# Patient Record
Sex: FEMALE | Race: WHITE | NOT HISPANIC OR LATINO | Employment: OTHER | URBAN - METROPOLITAN AREA
[De-identification: names, ages, dates, MRNs, and addresses within clinical notes are randomized per-mention and may not be internally consistent; named-entity substitution may affect disease eponyms.]

---

## 2017-05-16 ENCOUNTER — TELEPHONE (OUTPATIENT)
Dept: FAMILY MEDICINE | Facility: CLINIC | Age: 27
End: 2017-05-16
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
## 2017-05-16 NOTE — TELEPHONE ENCOUNTER
Patient calling to set up an appointment for ADHD. She was seen about 3 years ago for this issue and would like to restart medication.

## 2017-11-18 ENCOUNTER — HEALTH MAINTENANCE LETTER (OUTPATIENT)
Age: 27
End: 2017-11-18

## 2018-01-15 ENCOUNTER — TELEPHONE (OUTPATIENT)
Dept: FAMILY MEDICINE | Facility: CLINIC | Age: 28
End: 2018-01-15

## 2018-01-15 NOTE — TELEPHONE ENCOUNTER
Phone number provided is not a working number.   Attempted to contact number in demographics, which is disconnected.  Candace Nunes RN

## 2018-01-15 NOTE — TELEPHONE ENCOUNTER
Patient is out of town would like to speak with Dr. Mixon about some abdominal bleeding would like her opinion   Please call to advice  Thank you

## 2018-01-15 NOTE — TELEPHONE ENCOUNTER
"\"your call cannot be completed at this time\".  patient has not seen Dr Criss Mixon in > 2+ years.   Per Dr Criss Mixon, patient will need to be seen in [place], will not be speaking with patient.  Candace Nunes RN     "

## 2018-01-17 NOTE — TELEPHONE ENCOUNTER
"\"Your call cannot be completed\".  Will close encounter, unable to contact patient after multiple attempts.  Candace Nunes RN     "

## 2018-02-03 ENCOUNTER — NURSE TRIAGE (OUTPATIENT)
Dept: NURSING | Facility: CLINIC | Age: 28
End: 2018-02-03

## 2018-02-04 ENCOUNTER — OFFICE VISIT (OUTPATIENT)
Dept: URGENT CARE | Facility: URGENT CARE | Age: 28
End: 2018-02-04
Payer: COMMERCIAL

## 2018-02-04 VITALS
OXYGEN SATURATION: 100 % | HEART RATE: 65 BPM | WEIGHT: 123.8 LBS | DIASTOLIC BLOOD PRESSURE: 70 MMHG | BODY MASS INDEX: 21.59 KG/M2 | SYSTOLIC BLOOD PRESSURE: 122 MMHG | TEMPERATURE: 98.5 F

## 2018-02-04 DIAGNOSIS — N93.9 ABNORMAL VAGINAL BLEEDING: Primary | ICD-10-CM

## 2018-02-04 LAB — BETA HCG QUAL IFA URINE: NEGATIVE

## 2018-02-04 PROCEDURE — 84703 CHORIONIC GONADOTROPIN ASSAY: CPT | Performed by: FAMILY MEDICINE

## 2018-02-04 PROCEDURE — 99213 OFFICE O/P EST LOW 20 MIN: CPT | Performed by: FAMILY MEDICINE

## 2018-02-04 NOTE — MR AVS SNAPSHOT
After Visit Summary   2/4/2018    Verna Tomlin    MRN: [id number]           Patient Information     Date Of Birth          1990        Visit Information        Provider Department      2/4/2018 12:15 PM Charlie Barriga MD [facility]        Today's Diagnoses     Abnormal vaginal bleeding    -  1       Follow-ups after your visit        Who to contact     If you have questions or need follow up information about today's clinic visit or your schedule please contact [facility] directly at [phone].  Normal or non-critical lab and imaging results will be communicated to you by [employer], letter or phone within 4 business days after the clinic has received the results. If you do not hear from us within 7 days, please contact the clinic through [employer] or phone. If you have a critical or abnormal lab result, we will notify you by phone as soon as possible.  Submit refill requests through [employer] or call your pharmacy and they will forward the refill request to us. Please allow 3 business days for your refill to be completed.          Additional Information About Your Visit        MyChart Information     [employer] gives you secure access to your electronic health record. If you see a primary care provider, you can also send messages to your care team and make appointments. If you have questions, please call your primary care clinic.  If you do not have a primary care provider, please call [phone] and they will assist you.        Care EveryWhere ID     This is your Care EveryWhere ID. This could be used by other organizations to access your [place] medical records  [id number]        Your Vitals Were     Pulse Temperature Pulse Oximetry BMI (Body Mass Index)          65 98.5  F (36.9  C) (Tympanic) 100% 21.59 kg/m2         Blood Pressure from Last 3 Encounters:   02/04/18 122/70   10/20/16 142/79   07/11/16 118/72    Weight from Last 3 Encounters:   02/04/18  123 lb 12.8 oz (56.2 kg)   10/20/16 126 lb 3.2 oz (57.2 kg)   07/11/16 126 lb (57.2 kg)              We Performed the Following     Beta HCG qual IFA urine - [facility] and [place]        Primary Care Provider Office Phone # Fax #    Opal Devang [facility] [phone]       [address] [facility] [address]        Equal Access to Services     ROMINA SUTHERLAND : Hadii aad ku hadasho Soomaali, waaxda luqadaha, qaybta kaalmada adeegyada, waxay idiin hayaan adeeg kharagermain la'david . So [facility] [phone].    ATENCIÓN: Si daniellala espjesenia, tiene a garcia disposición servicios gratuitos de asistencia lingüística. [facility] [phone].    We comply with applicable federal civil rights laws and [place] laws. We do not discriminate on the basis of race, color, national origin, age, disability, sex, sexual orientation, or gender identity.            Thank you!     Thank you for choosing [facility]  for your care. Our goal is always to provide you with excellent care. Hearing back from our patients is one way we can continue to improve our services. Please take a few minutes to complete the written survey that you may receive in the mail after your visit with us. Thank you!             Your Updated Medication List - Protect others around you: Learn how to safely use, store and throw away your medicines at www.disposemymeds.org.          This list is accurate as of 2/4/18  1:36 PM.  Always use your most recent med list.                   Brand Name Dispense Instructions for use Diagnosis    ondansetron 4 MG ODT tab    ZOFRAN ODT    20 tablet    Take 1-2 tablets (4-8 mg) by mouth every 8 hours as needed for nausea    Mastitis, left, acute, Intractable vomiting with nausea, unspecified vomiting type

## 2018-02-04 NOTE — NURSING NOTE
"Chief Complaint   Patient presents with     Abnormal Bleeding Problem       Initial /70  Pulse 65  Temp 98.5  F (36.9  C) (Tympanic)  Wt 123 lb 12.8 oz (56.2 kg)  SpO2 100%  BMI 21.59 kg/m2 Estimated body mass index is 21.59 kg/(m^2) as calculated from the following:    Height as of 1/25/16: 5' 3.5\" (1.613 m).    Weight as of this encounter: 123 lb 12.8 oz (56.2 kg).  Medication Reconciliation: complete     Molly Orozco MA      "

## 2018-02-04 NOTE — TELEPHONE ENCOUNTER
"  Reason for Disposition    [1] MILD-MODERATE pain AND [2] constant AND [3] present > 2 hours    Additional Information    Negative: Shock suspected (e.g., cold/pale/clammy skin, too weak to stand, low BP, rapid pulse)    Negative: Difficult to awaken or acting confused  (e.g., disoriented, slurred speech)    Negative: Passed out (i.e., lost consciousness, collapsed and was not responding)    Negative: Sounds like a life-threatening emergency to the triager    Negative: Chest pain    Negative: Pain is mainly in upper abdomen  (if needed ask: \"is it mainly above the belly button?\")    Negative: Followed an abdomen (stomach) injury    Negative: [1] Abdominal pain AND [2] pregnant < 20 weeks    Negative: [1] Abdominal pain AND [2] pregnant > 20 weeks    Negative: [1] Abdominal pain AND [2] postpartum < 1 month since delivery    Negative: [1] SEVERE pain (e.g., excruciating) AND [2] present > 1 hour    Negative: [1] SEVERE pain AND [2] age > 60    Negative: [1] Vomiting AND [2] contains red blood or black (\"coffee ground\") material  (Exception: few red streaks in vomit that only happened once)    Negative: Blood in bowel movements   (Exception: blood on surface of BM with constipation)    Negative: Black or tarry bowel movements  (Exception: chronic-unchanged  black-grey bowel movements AND is taking iron pills or Pepto-bismol)    Negative: Patient sounds very sick or weak to the triager    Protocols used: ABDOMINAL PAIN - FEMALE-ADULT-DARIO    Verna calls and says that she has some lower, middle, abdominal cramping. Cramping began today. Cramping pain = 3/10. Pt. Says that she is 2 weeks late, for her period. Pt. Says that her home pregnancy test, from last night, was negative. Pt. Also says that she has slight, brown, vaginal discharge. Pt. Says that she may go to the [facility] tonight.  "

## 2018-02-04 NOTE — PROGRESS NOTES
Chief Complaint   Patient presents with     Abnormal Bleeding Problem      ADDITIONAL HPI: 27 year old female here for above issue. LMP 12/27-31. Then had bleeding 1/15 very light for a few days.  Last few days has had bleeding after intrcourse. Denies any abdomen/pelvic pain. No vaginal discharge.    ROS: 10 point review of systems negative except as per HPI.    PAST MEDICAL HISTORY:  Past Medical History:   Diagnosis Date     ADD (attention deficit disorder with hyperactivity)      Alcohol abuse 5/29/2012     Anxiety      ASCUS favor benign 9/2014    Neg high risk HPV - pap in 3 yrs per [facility]     ASCUS on Pap smear 8/3/10    High risk HPV 66     Asthma     excercise induced     Borderline personality disorder 5/29/2012     Depression      Depressive disorder 10/10/2014     Polysubstance abuse 5/29/2012     Varicella without mention of complication 1997    Chickenpox        ACTIVE MEDICAL PROBLEMS:  Patient Active Problem List   Diagnosis     ADHD (attention deficit hyperactivity disorder)     Varicella     Health Care Home     CARDIOVASCULAR SCREENING; LDL GOAL LESS THAN 160     Mild major depression (H)     Borderline personality disorder     Anxiety     Supervision of normal first pregnancy     Trochanteric bursitis of both hips     Left knee pain        FAMILY HISTORY:  Family History   Problem Relation Age of Onset     DIABETES Mother      CANCER Mother      thryroid cancer     Thyroid Disease Mother      DIABETES Maternal Grandmother      CANCER Maternal Grandmother      thyroid cancer     Thyroid Disease Maternal Grandmother      DIABETES Maternal Grandfather      Prostate Cancer Maternal Grandfather      Hypertension Father      Hypertension Paternal Grandfather      Thyroid Disease Other        SOCIAL HISTORY:  Social History     Social History     Marital status:      Spouse name: N/A     Number of children: N/A     Years of education: N/A     Occupational History     Not on file.     Social  History Main Topics     Smoking status: Never Smoker     Smokeless tobacco: Never Used      Comment: nonsmoking household     Alcohol use No     Drug use: No      Comment: Heroin use     Sexual activity: Yes     Partners: Male     Other Topics Concern     Parent/Sibling W/ Cabg, Mi Or Angioplasty Before 65f 55m? No     Social History Narrative       MEDICATIONS:  Current Outpatient Prescriptions   Medication Sig Dispense Refill     ondansetron (ZOFRAN ODT) 4 MG disintegrating tablet Take 1-2 tablets (4-8 mg) by mouth every 8 hours as needed for nausea (Patient not taking: Reported on 2/4/2018) 20 tablet 1       ALLERGIES:     Allergies   Allergen Reactions     Bactrim Rash       Problem list, Medication list, Allergies, and Medical/Social/Surgical histories reviewed in [facility] and updated as appropriate.    OBJECTIVE:                                                    VITALS: /70  Pulse 65  Temp 98.5  F (36.9  C) (Tympanic)  Wt 123 lb 12.8 oz (56.2 kg)  SpO2 100%  BMI 21.59 kg/m2 Body mass index is 21.59 kg/(m^2).  GENERAL: Pleasant, well appearing female.  ABDOMEN: Soft, nondistended, nontender, no hepatosplenomegaly. No palpable masses.     Results for orders placed or performed in visit on 02/04/18   Beta HCG qual IFA urine - [facility] [place]   Result Value Ref Range    Beta HCG Qual IFA Urine Negative NEG^Negative           ASSESSMENT/PLAN:                                                    1. Abnormal vaginal bleeding  Possible early SAB or failed implantation leading to irregular bleeding. Recommended pelvic exam today but patient declined. She mainly wanted to know if she's pregnant or not. Advised follow-up PRN if ongoing irregular bleeding or other pelvic symptoms.  - Beta HCG qual IFA urine - [facility] [place]

## 2018-02-13 ENCOUNTER — RECORDS - HEALTHEAST (OUTPATIENT)
Dept: ADMINISTRATIVE | Facility: OTHER | Age: 28
End: 2018-02-13

## 2018-05-11 ENCOUNTER — TELEPHONE (OUTPATIENT)
Dept: FAMILY MEDICINE | Facility: CLINIC | Age: 28
End: 2018-05-11

## 2018-05-11 NOTE — TELEPHONE ENCOUNTER
Patient also running the same thing by me.  I listened and she is anxious.  She had a very scant ? Menses.  ? If she didn't ovulate, ? If she is early in pregnancy? If she is just late for menses.     I did advise her to take prenatal vits, no alcohol.  Repeat Pregnancy test this weekend if she doesn't have menses. See LEONA Junior RN- Triage [employer]

## 2018-05-11 NOTE — TELEPHONE ENCOUNTER
"Patient called requesting an appt. Patient stated she is concerned since her period was very short. Patient does not remember LMP other than \"a couple days ago.\" It was very difficult to hear patient on the phone and several times attempted to have patient clarify what she is saying. Patient reported an abnl pap in the past so not sure if that is the cause. Patient stated she has done UPT and it is negative. Patient requested an appt with a female physician. Offered her an appt on 05-24-18 at 1330 with Dr. Rodríguez. Patient very is appreciative of assistance. Gabriela Garcia RN, BAN    "

## 2018-05-11 NOTE — TELEPHONE ENCOUNTER
"Pt has not been seen here for several years. She is calling because she had a 1 hour period and normally she has a 4 day period. Neg pregnancy test. She would like to know what to do [phone]. [facility] and states they are not very \"female friendly\" Thank you.  Latanya Pearl,     "

## 2018-05-11 NOTE — TELEPHONE ENCOUNTER
Left message for Verna to call back.  Looks like she had abnormal bleeding in Feb also.  Has she been seen? Should be seen her on OBGYN  Keli Junior RN- Triage [employer]

## 2018-05-17 ENCOUNTER — TRANSFERRED RECORDS (OUTPATIENT)
Dept: HEALTH INFORMATION MANAGEMENT | Facility: CLINIC | Age: 28
End: 2018-05-17

## 2018-05-19 ENCOUNTER — TRANSFERRED RECORDS (OUTPATIENT)
Dept: HEALTH INFORMATION MANAGEMENT | Facility: CLINIC | Age: 28
End: 2018-05-19

## 2018-05-23 ENCOUNTER — TRANSFERRED RECORDS (OUTPATIENT)
Dept: HEALTH INFORMATION MANAGEMENT | Facility: CLINIC | Age: 28
End: 2018-05-23

## 2018-05-28 ENCOUNTER — MEDICAL CORRESPONDENCE (OUTPATIENT)
Dept: HEALTH INFORMATION MANAGEMENT | Facility: CLINIC | Age: 28
End: 2018-05-28

## 2018-05-28 ENCOUNTER — TRANSFERRED RECORDS (OUTPATIENT)
Dept: HEALTH INFORMATION MANAGEMENT | Facility: CLINIC | Age: 28
End: 2018-05-28

## 2018-06-06 ENCOUNTER — TRANSFERRED RECORDS (OUTPATIENT)
Dept: HEALTH INFORMATION MANAGEMENT | Facility: CLINIC | Age: 28
End: 2018-06-06

## 2018-06-09 ENCOUNTER — TRANSFERRED RECORDS (OUTPATIENT)
Dept: HEALTH INFORMATION MANAGEMENT | Facility: CLINIC | Age: 28
End: 2018-06-09

## 2018-06-09 LAB
ALT SERPL-CCNC: 22 U/L (ref 13–61)
AST SERPL-CCNC: 18 U/L (ref 15–37)
CREAT SERPL-MCNC: 0.9 MG/DL (ref 0.5–1)
GFR SERPL CREATININE-BSD FRML MDRD: >60 ML/MIN/1.73M2
GLUCOSE SERPL-MCNC: 111 MG/DL (ref 70–100)
POTASSIUM SERPL-SCNC: 4 MMOL/L (ref 3.5–5)

## 2018-06-21 ENCOUNTER — TRANSFERRED RECORDS (OUTPATIENT)
Dept: HEALTH INFORMATION MANAGEMENT | Facility: CLINIC | Age: 28
End: 2018-06-21

## 2018-07-04 ENCOUNTER — HOSPITAL ENCOUNTER (EMERGENCY)
Facility: CLINIC | Age: 28
Discharge: PSYCHIATRIC OR SUBSTANCE USE FACILITY | End: 2018-07-05
Attending: EMERGENCY MEDICINE | Admitting: EMERGENCY MEDICINE

## 2018-07-04 DIAGNOSIS — F30.9 MANIA (H): ICD-10-CM

## 2018-07-04 LAB
AMPHETAMINES UR QL SCN: POSITIVE
ANION GAP SERPL CALCULATED.3IONS-SCNC: 8 MMOL/L (ref 3–14)
APAP SERPL-MCNC: <2 MG/L (ref 10–20)
BARBITURATES UR QL: NEGATIVE
BASOPHILS # BLD AUTO: 0 10E9/L (ref 0–0.2)
BASOPHILS NFR BLD AUTO: 0.4 %
BENZODIAZ UR QL: NEGATIVE
BUN SERPL-MCNC: 10 MG/DL (ref 7–30)
CALCIUM SERPL-MCNC: 8.8 MG/DL (ref 8.5–10.1)
CANNABINOIDS UR QL SCN: NEGATIVE
CHLORIDE SERPL-SCNC: 112 MMOL/L (ref 94–109)
CO2 SERPL-SCNC: 24 MMOL/L (ref 20–32)
COCAINE UR QL: NEGATIVE
CREAT SERPL-MCNC: 0.94 MG/DL (ref 0.52–1.04)
DIFFERENTIAL METHOD BLD: NORMAL
EOSINOPHIL # BLD AUTO: 0.1 10E9/L (ref 0–0.7)
EOSINOPHIL NFR BLD AUTO: 1.6 %
ERYTHROCYTE [DISTWIDTH] IN BLOOD BY AUTOMATED COUNT: 13.3 % (ref 10–15)
ETHANOL SERPL-MCNC: 0.15 G/DL
GFR SERPL CREATININE-BSD FRML MDRD: 72 ML/MIN/1.7M2
GLUCOSE SERPL-MCNC: 64 MG/DL (ref 70–99)
HCG UR QL: NEGATIVE
HCT VFR BLD AUTO: 42.3 % (ref 35–47)
HGB BLD-MCNC: 14.7 G/DL (ref 11.7–15.7)
IMM GRANULOCYTES # BLD: 0 10E9/L (ref 0–0.4)
IMM GRANULOCYTES NFR BLD: 0.1 %
LYMPHOCYTES # BLD AUTO: 3 10E9/L (ref 0.8–5.3)
LYMPHOCYTES NFR BLD AUTO: 35.3 %
MCH RBC QN AUTO: 29.9 PG (ref 26.5–33)
MCHC RBC AUTO-ENTMCNC: 34.8 G/DL (ref 31.5–36.5)
MCV RBC AUTO: 86 FL (ref 78–100)
MONOCYTES # BLD AUTO: 0.6 10E9/L (ref 0–1.3)
MONOCYTES NFR BLD AUTO: 7.5 %
NEUTROPHILS # BLD AUTO: 4.7 10E9/L (ref 1.6–8.3)
NEUTROPHILS NFR BLD AUTO: 55.1 %
NRBC # BLD AUTO: 0 10*3/UL
NRBC BLD AUTO-RTO: 0 /100
OPIATES UR QL SCN: NEGATIVE
PCP UR QL SCN: NEGATIVE
PLATELET # BLD AUTO: 274 10E9/L (ref 150–450)
POTASSIUM SERPL-SCNC: 3.8 MMOL/L (ref 3.4–5.3)
RBC # BLD AUTO: 4.91 10E12/L (ref 3.8–5.2)
SALICYLATES SERPL-MCNC: <2 MG/DL
SODIUM SERPL-SCNC: 144 MMOL/L (ref 133–144)
WBC # BLD AUTO: 8.6 10E9/L (ref 4–11)

## 2018-07-04 PROCEDURE — 80320 DRUG SCREEN QUANTALCOHOLS: CPT | Performed by: EMERGENCY MEDICINE

## 2018-07-04 PROCEDURE — 90791 PSYCH DIAGNOSTIC EVALUATION: CPT

## 2018-07-04 PROCEDURE — 81025 URINE PREGNANCY TEST: CPT | Performed by: EMERGENCY MEDICINE

## 2018-07-04 PROCEDURE — 80329 ANALGESICS NON-OPIOID 1 OR 2: CPT | Performed by: EMERGENCY MEDICINE

## 2018-07-04 PROCEDURE — 80307 DRUG TEST PRSMV CHEM ANLYZR: CPT | Performed by: EMERGENCY MEDICINE

## 2018-07-04 PROCEDURE — 25000132 ZZH RX MED GY IP 250 OP 250 PS 637: Performed by: EMERGENCY MEDICINE

## 2018-07-04 PROCEDURE — 80048 BASIC METABOLIC PNL TOTAL CA: CPT | Performed by: EMERGENCY MEDICINE

## 2018-07-04 PROCEDURE — 85025 COMPLETE CBC W/AUTO DIFF WBC: CPT | Performed by: EMERGENCY MEDICINE

## 2018-07-04 PROCEDURE — 99285 EMERGENCY DEPT VISIT HI MDM: CPT | Mod: 25

## 2018-07-04 RX ORDER — ZIPRASIDONE HYDROCHLORIDE 40 MG/1
40 CAPSULE ORAL 2 TIMES DAILY WITH MEALS
Status: DISCONTINUED | OUTPATIENT
Start: 2018-07-04 | End: 2018-07-05 | Stop reason: HOSPADM

## 2018-07-04 RX ORDER — OLANZAPINE 5 MG/1
5 TABLET, ORALLY DISINTEGRATING ORAL ONCE
Status: COMPLETED | OUTPATIENT
Start: 2018-07-04 | End: 2018-07-04

## 2018-07-04 RX ORDER — ALBUTEROL SULFATE 90 UG/1
2 AEROSOL, METERED RESPIRATORY (INHALATION) EVERY 4 HOURS PRN
COMMUNITY
End: 2020-08-19

## 2018-07-04 RX ADMIN — ZIPRASIDONE HCL 40 MG: 40 CAPSULE ORAL at 22:12

## 2018-07-04 RX ADMIN — OLANZAPINE 5 MG: 5 TABLET, ORALLY DISINTEGRATING ORAL at 18:20
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
## 2018-07-04 NOTE — ED NOTES
[facility] go the [facility]. MD aware. Pt calls 911. MD at bedside and phone taken away from pt. Garage door reclosed with phone behind it. Security notified of 911 call.

## 2018-07-04 NOTE — ED NOTES
Per [facility], pt unable to be found [facility] at the [facility].  of pt calls the [facility] and MD calls back. Phone # [phone]
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
## 2018-07-05 ENCOUNTER — HOSPITAL ENCOUNTER (INPATIENT)
Facility: CLINIC | Age: 28
LOS: 1 days | Discharge: HOME | DRG: 885 | End: 2018-07-06
Attending: PSYCHIATRY & NEUROLOGY | Admitting: PSYCHIATRY & NEUROLOGY

## 2018-07-05 VITALS
OXYGEN SATURATION: 97 % | SYSTOLIC BLOOD PRESSURE: 101 MMHG | DIASTOLIC BLOOD PRESSURE: 69 MMHG | RESPIRATION RATE: 16 BRPM | TEMPERATURE: 98.7 F

## 2018-07-05 PROBLEM — F31.9 BIPOLAR DISORDER (H): Status: ACTIVE | Noted: 2018-07-05

## 2018-07-05 LAB — ALCOHOL BREATH TEST: 0 (ref 0–0.01)

## 2018-07-05 PROCEDURE — 25000132 ZZH RX MED GY IP 250 OP 250 PS 637: Performed by: PSYCHIATRY & NEUROLOGY

## 2018-07-05 PROCEDURE — 12400007 ZZH R&B MH INTERMEDIATE UMMC

## 2018-07-05 PROCEDURE — 25000132 ZZH RX MED GY IP 250 OP 250 PS 637: Performed by: EMERGENCY MEDICINE

## 2018-07-05 RX ORDER — HYDROXYZINE HYDROCHLORIDE 25 MG/1
25 TABLET, FILM COATED ORAL EVERY 4 HOURS PRN
Status: DISCONTINUED | OUTPATIENT
Start: 2018-07-05 | End: 2018-07-06 | Stop reason: HOSPADM

## 2018-07-05 RX ORDER — WATER 10 ML/10ML
INJECTION INTRAMUSCULAR; INTRAVENOUS; SUBCUTANEOUS
Status: DISCONTINUED
Start: 2018-07-05 | End: 2018-07-05 | Stop reason: HOSPADM

## 2018-07-05 RX ORDER — ACETAMINOPHEN 325 MG/1
650 TABLET ORAL EVERY 4 HOURS PRN
Status: DISCONTINUED | OUTPATIENT
Start: 2018-07-05 | End: 2018-07-06 | Stop reason: HOSPADM

## 2018-07-05 RX ORDER — TRAZODONE HYDROCHLORIDE 50 MG/1
50 TABLET, FILM COATED ORAL
Status: DISCONTINUED | OUTPATIENT
Start: 2018-07-05 | End: 2018-07-06 | Stop reason: HOSPADM

## 2018-07-05 RX ORDER — ALUMINA, MAGNESIA, AND SIMETHICONE 2400; 2400; 240 MG/30ML; MG/30ML; MG/30ML
30 SUSPENSION ORAL EVERY 4 HOURS PRN
Status: DISCONTINUED | OUTPATIENT
Start: 2018-07-05 | End: 2018-07-06 | Stop reason: HOSPADM

## 2018-07-05 RX ORDER — OLANZAPINE 10 MG/2ML
10 INJECTION, POWDER, FOR SOLUTION INTRAMUSCULAR
Status: DISCONTINUED | OUTPATIENT
Start: 2018-07-05 | End: 2018-07-06 | Stop reason: HOSPADM

## 2018-07-05 RX ORDER — ALBUTEROL SULFATE 90 UG/1
2 AEROSOL, METERED RESPIRATORY (INHALATION) EVERY 4 HOURS PRN
Status: DISCONTINUED | OUTPATIENT
Start: 2018-07-05 | End: 2018-07-06 | Stop reason: HOSPADM

## 2018-07-05 RX ORDER — PRAZOSIN HYDROCHLORIDE 1 MG/1
1 CAPSULE ORAL AT BEDTIME
Status: COMPLETED | OUTPATIENT
Start: 2018-07-05 | End: 2018-07-05

## 2018-07-05 RX ORDER — ZIPRASIDONE HYDROCHLORIDE 20 MG/1
40 CAPSULE ORAL 2 TIMES DAILY WITH MEALS
Status: DISCONTINUED | OUTPATIENT
Start: 2018-07-05 | End: 2018-07-06 | Stop reason: HOSPADM

## 2018-07-05 RX ORDER — OLANZAPINE 10 MG/1
10 TABLET ORAL
Status: DISCONTINUED | OUTPATIENT
Start: 2018-07-05 | End: 2018-07-06 | Stop reason: HOSPADM

## 2018-07-05 RX ADMIN — TRAZODONE HYDROCHLORIDE 50 MG: 50 TABLET ORAL at 20:53

## 2018-07-05 RX ADMIN — PRAZOSIN HYDROCHLORIDE 1 MG: 1 CAPSULE ORAL at 20:53

## 2018-07-05 RX ADMIN — ZIPRASIDONE HCL 40 MG: 20 CAPSULE ORAL at 19:56

## 2018-07-05 RX ADMIN — ZIPRASIDONE HCL 40 MG: 40 CAPSULE ORAL at 09:52

## 2018-07-05 ASSESSMENT — ACTIVITIES OF DAILY LIVING (ADL)
ORAL_HYGIENE: INDEPENDENT
DRESS: INDEPENDENT
GROOMING: SHOWER

## 2018-07-05 NOTE — IP AVS SNAPSHOT
Young Adult [facility]    [facility] [address]    [address] [address]    Phone:  [phone]                                       After Visit Summary   7/5/2018    Verna Moralez    MRN: [id number]           After Visit Summary Signature Page     I have received my discharge instructions, and my questions have been answered. I have discussed any challenges I see with this plan with the nurse or doctor.    ..........................................................................................................................................  Patient/Patient Representative Signature      ..........................................................................................................................................  Patient Representative Print Name and Relationship to Patient    ..................................................               ................................................  Date                                            Time    ..........................................................................................................................................  Reviewed by Signature/Title    ...................................................              ..............................................  Date                                                            Time
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
## 2018-07-05 NOTE — PROGRESS NOTES
Patient has an opened package of Natural Urdu Kratom capsules and a bottle of hydroxyzine.  These items are placed in an envelope for security.

## 2018-07-05 NOTE — IP AVS SNAPSHOT
MRN:[id number]                      After Visit Summary   7/5/2018    Verna Moralez    MRN: [id number]           Patient Information     Date Of Birth          1990        Designated Caregiver       Most Recent Value    Caregiver    Will someone help with your care after discharge? no      About your hospital stay     You were admitted on:  July 5, 2018 You last received care in the:  [facility]    You were discharged on:  July 6, 2018       Who to Call     For medical emergencies, please call 911.  For non-urgent questions about your medical care, please call your primary care provider or clinic, [phone]          Attending Provider     Provider Specialty    Brian Kelley MD Psychiatry       Primary Care Provider Office Phone # Fax #    [facility] [phone]      Further instructions from your care team        Behavioral Discharge Planning and Instructions      Summary:  You were admitted on 7/5/2018  due to Depression, PTSD (Post Tramatic Stress Disorder), Suicidal Ideations and Chemical Use Issues.  You were treated by Dr. Brian Kelley MD and discharged on 07/06/2018 from [facility] to Home      Principal Diagnosis:       Health Care Follow-up Appointments:   Psychiatry:  Date/Time: Tuesday July, 10, 3:30 PM    Provider: Dr. Rajni MD  [facility]  Address: [address] [place] [address]   Phone [phone]:  Fax: [phone]    Therapy:  Date/Time: Monday, July, 9, 1:00 PM    Provider: Ethel Adan  [facility]  Address: [address] [place] [address]   Phone [phone]:  Fax: [phone]  .  Attend all scheduled appointments with your outpatient providers. Call at least 24 hours in advance if you need to reschedule an appointment to ensure continued access to your outpatient providers.   Major Treatments, Procedures and Findings:  You were provided with: a  "psychiatric assessment, assessed for medical stability, medication evaluation and/or management, group therapy and milieu management    Symptoms to Report: feeling more aggressive, increased confusion, losing more sleep, mood getting worse or thoughts of suicide    Early warning signs can include: increased depression or anxiety sleep disturbances increased thoughts or behaviors of suicide or self-harm  increased unusual thinking, such as paranoia or hearing voices    Safety and Wellness:  Take all medicines as directed.  Make no changes unless your doctor suggests them.      Follow treatment recommendations.  Refrain from alcohol and non-prescribed drugs.  If there is a concern for safety, call 911.    Resources:   Crisis Intervention: 254.664.6499 or 377-206-2171 (TTY: 849.100.5436).  Call anytime for help.  [employer] [place] on Mental Illness (www.mn.deidra.org): 140.233.8488 or 477-099-7182.  Alcoholics Anonymous (www.alcoholics-anonymous.org): Check your phone book for your local chapter.  Suicide Awareness Voices of Education (SAVE) (www.save.org): 497-402-SAVE (8887)  [facility]- Phone: [phone]      The treatment team has appreciated the opportunity to work with you.     If you have any questions or concerns our unit number is [phone]  You may be receiving a follow-up phone call within the next three days from a representative from behavioral health.    You have identified the best phone number to reach you as [phone]          Pending Results     No orders found for last 3 day(s).            Admission Information     Date & Time Provider Department Dept. Phone    7/5/2018 Brian Kelley MD [facility] [phone]      Your Vitals Were     Blood Pressure Pulse Temperature Respirations Height Weight    119/57 66 96  F (35.6  C) (Tympanic) 16 1.6 m (5' 3\") 53.5 kg (117 lb 15.1 oz)    Pulse Oximetry BMI (Body Mass Index)                98% 20.89 " "kg/m2          MyChart Information     ShowUhow lets you send messages to your doctor, view your test results, renew your prescriptions, schedule appointments and more. To sign up, go to [email or web address] . Click on \"Log in\" on the left side of the screen, which will take you to the Welcome page. Then click on \"Sign up Now\" on the right side of the page.     You will be asked to enter the access code listed below, as well as some personal information. Please follow the directions to create your username and password.     Your access code is: [email or web address]  Expires: 10/4/2018 12:41 PM     Your access code will  in 90 days. If you need help or a new code, please call your [place] clinic or [phone].        Care EveryWhere ID     This is your Care EveryWhere ID. This could be used by other organizations to access your [facility] medical records  [id number]        Equal Access to Services     ROMINA SUTHERLAND : Hadii cleveland huertao Soaakash, waaxda luqadaha, qaybta kaalmada adeegyada, selene villarreal . So [facility] [phone].    ATENCIÓN: Si habla español, tiene a garcia disposición servicios gratuitos de asistencia lingüística. Llame al [phone].    We comply with applicable federal civil rights laws and [place] laws. We do not discriminate on the basis of race, color, national origin, age, disability, sex, sexual orientation, or gender identity.               Review of your medicines      CONTINUE these medicines which have NOT CHANGED        Dose / Directions    albuterol 108 (90 Base) MCG/ACT Inhaler   Commonly known as:  PROAIR HFA/PROVENTIL HFA/VENTOLIN HFA        Dose:  2 puff   Inhale 2 puffs into the lungs every 4 hours as needed for shortness of breath / dyspnea or wheezing   Refills:  0       GABAPENTIN PO   Indication:  pain & numbness        Dose:  600 mg   Take 600 mg by mouth 3 times daily 2 x 300mg caps   Refills:  0       NITROFURANTOIN MONOHYD MACRO PO        Dose:  " 100 mg   Take 100 mg by mouth daily as needed (for use after intercourse to prevent UTI)   Refills:  0       ZIPRASIDONE HCL PO   Indication:  mood/anxiety        Dose:  40 mg   Take 40 mg by mouth 2 times daily (with meals)   Refills:  0                Protect others around you: Learn how to safely use, store and throw away your medicines at www.disposemymeds.org.             Medication List: This is a list of all your medications and when to take them. Check marks below indicate your daily home schedule. Keep this list as a reference.      Medications           Morning Afternoon Evening Bedtime As Needed    albuterol 108 (90 Base) MCG/ACT Inhaler   Commonly known as:  PROAIR HFA/PROVENTIL HFA/VENTOLIN HFA   Inhale 2 puffs into the lungs every 4 hours as needed for shortness of breath / dyspnea or wheezing                                GABAPENTIN PO   Take 600 mg by mouth 3 times daily 2 x 300mg caps                                NITROFURANTOIN MONOHYD MACRO PO   Take 100 mg by mouth daily as needed (for use after intercourse to prevent UTI)                                ZIPRASIDONE HCL PO   Take 40 mg by mouth 2 times daily (with meals)   Last time this was given:  40 mg on 7/6/2018  8:30 AM

## 2018-07-05 NOTE — ED NOTES
Signed out by Dr Mcginnis at change of shift.  In brief patient has been awaiting inpatient mental health bed availability.  Is a  with a h/o PTSD.  Provider and DEC assessment in agreement with admission for disorganized thoughts and manic symptoms.  Received Zyprexa on previous shift.    0830 - sleeping    1015 - Talked to DEC and nursing about patient.  Have her maiden name and SSN to [facility] to [facility] more [facility] about [facility] and history and determine if bed is available there.    Ultimately bed found.         Demetria Smith MD  07/05/18 3635

## 2018-07-05 NOTE — ED NOTES
Patient awake in bed asking for spouse's number to contact.  [facility]  Patient provided maiden name MENSCH.  Updated intake.  Patient working on ordering breakfast.  Patient was compliant with morning medication.

## 2018-07-05 NOTE — PROGRESS NOTES
07/05/18 1500   Patient Belongings   Did you bring any home meds/supplements to the hospital?  Yes   Disposition of meds  Sent to security/pharmacy per site process   Patient Belongings other (see comments)   Disposition of Belongings Other (see comment)   Belongings Search Yes   Clothing Search Yes   Second Staff (Yina Kramer)     Bin:  Black bag, DSM-5,  Spiral notebook, gray sweatshirt, green leggings, black bra, gray t-shirt, black flip flops, navy blue t-shirt, keys, charging plug, toiletries (razor inside), makeup    Given to RN:  Medication (1) and herbal supplement    Security ([id number]):  Visa Debit 3652    A               Admission:  I am responsible for any personal items that are not sent to the safe or pharmacy.  [facility] is not responsible for loss, theft or damage of any property in my possession.    Signature:  _________________________________ Date: _______  Time: _____                                              Staff Signature:  ____________________________ Date: ________  Time: _____      2nd Staff person, if patient is unable/unwilling to sign:    Signature: ________________________________ Date: ________  Time: _____     Discharge:  [facility] has returned all of my personal belongings:    Signature: _________________________________ Date: ________  Time: _____                                          Staff Signature:  ____________________________ Date: ________  Time: _____
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
## 2018-07-05 NOTE — ED NOTES
I assumed care of this patient at 5 PM.  She was signed out to me awaiting DEC  evaluation when she was more sober.  I have talked with Lore who feels that she should be admitted due to disorganized thoughts and manic behavior.  There are no beds and she will be kept her awaiting bed placement.  I have had pharmacy do a medication reconciliation and prescribed her Geodon.  Gabapentin is listed but in my review of her records it says this is a as needed for nightmares which he does not appear to have had here.  I did request and get [facility] records and have given these to DEC and these are quite extensive.  The records say she has been previously at [facility] but [facility] cannot find these records.  There has been some confusion with her having a previous name and perhaps the records have not been merged at all locations.  The patient has been checked on here;  I have talked to her and she was quite manic but cooperative.  After Zyprexa she has been largely sleeping and has not created any problems.  She will be awaiting bed placement in the morning.  She is currently on a health officer hold.  It may be worth rechecking with the [facility] in the morning to see if there are any female beds.     Lissy Blackman MD  07/05/18 0052

## 2018-07-05 NOTE — ED NOTES
Spoke with patient updating possible admission plans.  Patient confirms she is not suicidal.  Patient has been cooperative under writer's care and expresses wanting to be admitted to the [facility].

## 2018-07-05 NOTE — PHARMACY-ADMISSION MEDICATION HISTORY
"Admission medication history interview status for the 2018  admission is complete. See EPIC admission navigator for prior to admission medications     Medication history source reliability:Good    Actions taken by pharmacist (provider contacted, etc): Obtained signed consent from patient to obtain records from the [facility]. [facility] records were faxed over and list was entered into Epic.     Additional medication history information not noted on PTA med list : Records were obtained under a previous last name of \"MENSH\". Same Social Security Number and Same .     Medication reconciliation/reorder completed by provider prior to medication history? No    Time spent in this activity: 45 minutes    Prior to Admission medications    Medication Sig Last Dose Taking? Auth Provider   albuterol (PROAIR HFA/PROVENTIL HFA/VENTOLIN HFA) 108 (90 Base) MCG/ACT Inhaler Inhale 2 puffs into the lungs every 4 hours as needed for shortness of breath / dyspnea or wheezing  Yes Unknown, Entered By History   GABAPENTIN PO Take 600 mg by mouth 3 times daily 2 x 300mg caps  Yes Unknown, Entered By History   NITROFURANTOIN MONOHYD MACRO PO Take 100 mg by mouth daily as needed (for use after intercourse to prevent UTI)  Yes Unknown, Entered By History   ZIPRASIDONE HCL PO Take 40 mg by mouth 2 times daily (with meals)  Yes Unknown, Entered By History         "

## 2018-07-05 NOTE — PLAN OF CARE
Problem: Sleep Impairment (Psychotic Signs/Symptoms) (Adult)  Goal: Improved Sleep Hygiene (Psychotic Signs/Symptoms)  Outcome: Therapy, unable to show any progress toward functional goals  RNADMIT/    Pt arrives to  at 1500 from [facility] where she was residing on a 72 hour hold initiated 7/5/18 at 1425; while speaking on phone to her current  [place] is [place], she reported plan to drink herself to death;  requested welfare check and pt apprehended from home intoxicated.  pt admitted to   for further psychiatric care due to SI statements while intoxicated, manic symptoms and delusional statements.  Safety: suicide, S15                 Illness Management Recovery model: Objectives    Patient will identify reason(s) for hospitalization from their perspective.  Patient will identify a minimum of three goals for discharge.  Patient will identify a minimum of three triggers that may increase their symptoms.  Patient will identify a minimum of three coping skills they can do to stay well.  Patient will identify their support system to demonstrate readiness for discharge.    Illness Management & Recovery will assist patient to develop relapse prevention as    patient identifies triggers for relapse.  patient identifies a general wellness strategy.  patient identifies the warning signs that they are in danger of relapse.  patient identifies someone they count on to get feedback .  patient identifies ways to take action when in danger of relapse.  patient identifies way to cope with stress or other symptoms.   patient participates in self-reflection.    15 min checks initiated. Brief orientation to unit provided.    PROVIDER: Warren Kinney will continue to monitor.

## 2018-07-05 NOTE — ED NOTES
Dr[facility] Reid [facility] the [facility] and [facility] a [facility].  Pt. Does receive service there.  There are no [facility] currently at the [facility] for [facility].

## 2018-07-06 VITALS
WEIGHT: 117.95 LBS | HEIGHT: 63 IN | TEMPERATURE: 96 F | RESPIRATION RATE: 16 BRPM | HEART RATE: 66 BPM | OXYGEN SATURATION: 98 % | BODY MASS INDEX: 20.9 KG/M2 | SYSTOLIC BLOOD PRESSURE: 119 MMHG | DIASTOLIC BLOOD PRESSURE: 57 MMHG

## 2018-07-06 PROCEDURE — 99236 HOSP IP/OBS SAME DATE HI 85: CPT | Performed by: PSYCHIATRY & NEUROLOGY

## 2018-07-06 PROCEDURE — 25000132 ZZH RX MED GY IP 250 OP 250 PS 637: Performed by: PSYCHIATRY & NEUROLOGY

## 2018-07-06 RX ADMIN — ZIPRASIDONE HCL 40 MG: 20 CAPSULE ORAL at 08:30

## 2018-07-06 RX ADMIN — ACETAMINOPHEN 650 MG: 325 TABLET, FILM COATED ORAL at 11:22

## 2018-07-06 NOTE — PROGRESS NOTES
Problem: Overarching Goals (Adult)  Goal: Optimized Coping Skills in Response to Life Stressors    OT: pt was pleasant and focused for full duration of OT groups remaining engaged and completing complex tasks, contributing to discussion with peers and staff, and pt reporting being excited to discharge, little was learned about pt due to pt discharging by PM group.       07/06/18 1543   Occupational Therapy   Type of Intervention structured groups   Response Participates   Hours 2
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
## 2018-07-06 NOTE — DISCHARGE INSTRUCTIONS
Behavioral Discharge Planning and Instructions      Summary:  You were admitted on 7/5/2018  due to Depression, PTSD (Post Tramatic Stress Disorder), Suicidal Ideations and Chemical Use Issues.  You were treated by Dr. Brian Kelley MD and discharged on 07/06/2018 from Station 4A to Home      Principal Diagnosis:       Health Care Follow-up Appointments:   Psychiatry:  Date/Time: Tuesday July, 10, 3:30 PM    Provider: Dr. Rajni MD  [facility]  Address: [address] [place] [address]   Phone [phone]:  Fax: [phone]    Therapy:  Date/Time: Monday, July, 9, 1:00 PM    Provider: Ethel Adan  [facility]  Address: [address] [place] [address]   Phone [phone]:  Fax: [phone]  .  Attend all scheduled appointments with your outpatient providers. Call at least 24 hours in advance if you need to reschedule an appointment to ensure continued access to your outpatient providers.   Major Treatments, Procedures and Findings:  You were provided with: a psychiatric assessment, assessed for medical stability, medication evaluation and/or management, group therapy and milieu management    Symptoms to Report: feeling more aggressive, increased confusion, losing more sleep, mood getting worse or thoughts of suicide    Early warning signs can include: increased depression or anxiety sleep disturbances increased thoughts or behaviors of suicide or self-harm  increased unusual thinking, such as paranoia or hearing voices    Safety and Wellness:  Take all medicines as directed.  Make no changes unless your doctor suggests them.      Follow treatment recommendations.  Refrain from alcohol and non-prescribed drugs.  If there is a concern for safety, call 911.    Resources:   Crisis Intervention: 190.898.1038 or 313-597-4367 (TTY: 526.184.9177).  Call anytime for help.  National [place] on Mental Illness (www.mn.deidra.org): 196.232.9222 or  715.798.4248.  Alcoholics Anonymous (www.alcoholics-anonymous.org): Check your phone book for your local chapter.  Suicide Awareness Voices of Education (SAVE) (www.save.org): 626-225-SAVE (4402)  [facility]- Phone: [phone]      The treatment team has appreciated the opportunity to work with you.     If you have any questions or concerns our unit number is [phone]  You may be receiving a follow-up phone call within the next three days from a representative from behavioral health.    You have identified the best phone number to reach you as [phone]
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
## 2018-07-06 NOTE — PLAN OF CARE
Problem: Patient Care Overview  Goal: Team Discussion  Team Plan:   BEHAVIORAL TEAM DISCUSSION    Participants: TAQUERIA Jauregui, Dr. Warren MD, Magdalene Garcia, RN  Progress: Improving  Continued Stay Criteria/Rationale: May discharge soon due to improvement  Medical/Physical: Per Internal Medicine  Precautions:   Behavioral Orders   Procedures     Code 1 - Restrict to Unit     Elopement precautions     Routine Programming     As clinically indicated     Status 15     Every 15 minutes.     Withdrawal precautions     Plan: Stabilize and work on outpatinet plan  Rationale for change in precautions or plan: Pt has improved since admission.

## 2018-07-06 NOTE — PLAN OF CARE
Problem: Overarching Goals (Adult)  Goal: Optimized Coping Skills in Response to Life Stressors  Outcome: Therapy, progress toward functional goals as expected  Patient has bee visible and social. Denies SI/SIB.  Thinking is linear and organized.  Denies thoughts to hurt others.  Acknowledged understanding of discharge follow-up and medications.  Discharged home with all belongings via cab.

## 2018-08-17 ENCOUNTER — NURSE TRIAGE (OUTPATIENT)
Dept: NURSING | Facility: CLINIC | Age: 28
End: 2018-08-17

## 2018-08-18 NOTE — TELEPHONE ENCOUNTER
"  Reason for Disposition    Patient sounds very sick or weak to the triager     \"I get UTI's monthly. My heart rate earlier was 160. [facility] and [facility] gave me [facility]. I am having flank pain, I'm shaking,I have chills. My fever goes from 96.0-102.0. My urine smells very strong.\" Denies other sx. Advised ER.    Additional Information    Negative: Shock suspected (e.g., cold/pale/clammy skin, too weak to stand, low BP, rapid pulse)    Negative: Sounds like a life-threatening emergency to the triager    Negative: Followed a genital area injury    Negative: Followed a genital area injury (penis, scrotum)    Negative: Vaginal discharge    Negative: Pus (white, yellow) or bloody discharge from end of penis    Negative: [1] Taking antibiotic for urinary tract infection (UTI) AND [2] female    Negative: [1] Taking antibiotic for urinary tract infection (UTI) AND [2] male    Negative: [1] Discomfort (pain, burning or stinging) when passing urine AND [2] pregnant    Negative: [1] Discomfort (pain, burning or stinging) when passing urine AND [2] postpartum < 1 month    Negative: [1] Discomfort (pain, burning or stinging) when passing urine AND [2] female    Negative: [1] Discomfort (pain, burning or stinging) when passing urine AND [2] male    Negative: Pain or itching in the vulvar area    Negative: Pain in scrotum is main symptom    Negative: Blood in the urine is main symptom    Negative: Symptoms arising from use of a urinary catheter ([place] or [place])    Negative: [1] Unable to urinate (or only a few drops) > 4 hours AND     [2] bladder feels very full (e.g., palpable bladder or strong urge to urinate)    Negative: [1] Decreased urination and [2] drinking very little AND [2] dehydration suspected (e.g., dark urine, no urine > 12 hours, very dry mouth, very lightheaded)    Protocols used: URINARY SYMPTOMS-ADULT-    "

## 2019-08-13 ENCOUNTER — HOSPITAL ENCOUNTER (EMERGENCY)
Facility: CLINIC | Age: 29
Discharge: HOME | End: 2019-08-13
Attending: NURSE PRACTITIONER

## 2019-08-13 VITALS
TEMPERATURE: 98 F | RESPIRATION RATE: 18 BRPM | DIASTOLIC BLOOD PRESSURE: 80 MMHG | OXYGEN SATURATION: 99 % | WEIGHT: 110 LBS | HEIGHT: 62 IN | BODY MASS INDEX: 20.24 KG/M2 | SYSTOLIC BLOOD PRESSURE: 111 MMHG | HEART RATE: 95 BPM

## 2019-08-13 ASSESSMENT — MIFFLIN-ST. JEOR: SCORE: 1187.21

## 2019-10-07 ENCOUNTER — HOSPITAL ENCOUNTER (EMERGENCY)
Facility: CLINIC | Age: 29
Discharge: LEFT BEFORE DISCHARGE | End: 2019-10-07
Attending: EMERGENCY MEDICINE

## 2019-10-07 VITALS
SYSTOLIC BLOOD PRESSURE: 130 MMHG | RESPIRATION RATE: 16 BRPM | HEART RATE: 63 BPM | TEMPERATURE: 97.9 F | BODY MASS INDEX: 21.58 KG/M2 | WEIGHT: 118 LBS | OXYGEN SATURATION: 99 % | DIASTOLIC BLOOD PRESSURE: 69 MMHG

## 2019-10-07 DIAGNOSIS — R10.84 ABDOMINAL PAIN, GENERALIZED: ICD-10-CM

## 2019-10-08 NOTE — ED NOTES
Pt reports here to get a an ER documentation that pt has lupus in order to get [facility] from the [facility].  Pt gertrudis reports sores in mouth like mother has and unable to get help with lupus due to [facility] and ER documentation.   Pt states was seen in [facility] ER 1 month ago and has been dealing with for 10 years and is unable to get help and the right medication due to documentation needed from the ER for the [facility] to believe her.   Pt requesting to see provider.

## 2019-10-16 ENCOUNTER — HOSPITAL ENCOUNTER (EMERGENCY)
Facility: CLINIC | Age: 29
Discharge: HOME | End: 2019-10-17
Attending: EMERGENCY MEDICINE | Admitting: EMERGENCY MEDICINE
Payer: COMMERCIAL

## 2019-10-16 DIAGNOSIS — E87.6 HYPOKALEMIA: ICD-10-CM

## 2019-10-16 DIAGNOSIS — R10.13 ABDOMINAL PAIN, EPIGASTRIC: ICD-10-CM

## 2019-10-16 DIAGNOSIS — R10.31 ABDOMINAL PAIN, RIGHT LOWER QUADRANT: ICD-10-CM

## 2019-10-16 PROCEDURE — 99285 EMERGENCY DEPT VISIT HI MDM: CPT | Mod: 25 | Performed by: EMERGENCY MEDICINE

## 2019-10-16 PROCEDURE — 99284 EMERGENCY DEPT VISIT MOD MDM: CPT | Mod: 25 | Performed by: EMERGENCY MEDICINE

## 2019-10-16 PROCEDURE — 76705 ECHO EXAM OF ABDOMEN: CPT | Mod: 26 | Performed by: EMERGENCY MEDICINE

## 2019-10-16 PROCEDURE — 76705 ECHO EXAM OF ABDOMEN: CPT | Performed by: EMERGENCY MEDICINE

## 2019-10-16 NOTE — ED AVS SNAPSHOT
[facility]  [address] [address]  Phone:  [phone]  Fax:  [phone]                                    Verna Tomlin   MRN: [id number]    Department:  [facility]   Date of Visit:  10/16/2019           After Visit Summary Signature Page    I have received my discharge instructions, and my questions have been answered. I have discussed any challenges I see with this plan with the nurse or doctor.    ..........................................................................................................................................  Patient/Patient Representative Signature      ..........................................................................................................................................  Patient Representative Print Name and Relationship to Patient    ..................................................               ................................................  Date                                   Time    ..........................................................................................................................................  Reviewed by Signature/Title    ...................................................              ..............................................  Date                                               Time          22EPIC Rev 08/18

## 2019-10-17 ENCOUNTER — APPOINTMENT (OUTPATIENT)
Dept: CT IMAGING | Facility: CLINIC | Age: 29
End: 2019-10-17
Attending: EMERGENCY MEDICINE
Payer: COMMERCIAL

## 2019-10-17 VITALS
DIASTOLIC BLOOD PRESSURE: 76 MMHG | SYSTOLIC BLOOD PRESSURE: 128 MMHG | RESPIRATION RATE: 16 BRPM | HEART RATE: 116 BPM | WEIGHT: 109 LBS | TEMPERATURE: 97.7 F | HEIGHT: 62 IN | OXYGEN SATURATION: 99 % | BODY MASS INDEX: 20.06 KG/M2

## 2019-10-17 LAB
ALBUMIN SERPL-MCNC: 4.5 G/DL (ref 3.4–5)
ALBUMIN UR-MCNC: NEGATIVE MG/DL
ALP SERPL-CCNC: 61 U/L (ref 40–150)
ALT SERPL W P-5'-P-CCNC: 27 U/L (ref 0–50)
ANION GAP SERPL CALCULATED.3IONS-SCNC: 10 MMOL/L (ref 3–14)
APPEARANCE UR: ABNORMAL
AST SERPL W P-5'-P-CCNC: 23 U/L (ref 0–45)
BASOPHILS # BLD AUTO: 0.1 10E9/L (ref 0–0.2)
BASOPHILS NFR BLD AUTO: 0.5 %
BILIRUB SERPL-MCNC: 0.5 MG/DL (ref 0.2–1.3)
BILIRUB UR QL STRIP: NEGATIVE
BUN SERPL-MCNC: 11 MG/DL (ref 7–30)
CALCIUM SERPL-MCNC: 10 MG/DL (ref 8.5–10.1)
CHLORIDE SERPL-SCNC: 106 MMOL/L (ref 94–109)
CO2 SERPL-SCNC: 23 MMOL/L (ref 20–32)
COLOR UR AUTO: YELLOW
CREAT SERPL-MCNC: 0.82 MG/DL (ref 0.52–1.04)
DIFFERENTIAL METHOD BLD: NORMAL
EOSINOPHIL # BLD AUTO: 0.1 10E9/L (ref 0–0.7)
EOSINOPHIL NFR BLD AUTO: 0.6 %
ERYTHROCYTE [DISTWIDTH] IN BLOOD BY AUTOMATED COUNT: 11.9 % (ref 10–15)
GFR SERPL CREATININE-BSD FRML MDRD: >90 ML/MIN/{1.73_M2}
GLUCOSE SERPL-MCNC: 110 MG/DL (ref 70–99)
GLUCOSE UR STRIP-MCNC: NEGATIVE MG/DL
HCG UR QL: NEGATIVE
HCT VFR BLD AUTO: 41.7 % (ref 35–47)
HGB BLD-MCNC: 14.2 G/DL (ref 11.7–15.7)
HGB UR QL STRIP: NEGATIVE
IMM GRANULOCYTES # BLD: 0 10E9/L (ref 0–0.4)
IMM GRANULOCYTES NFR BLD: 0.2 %
KETONES UR STRIP-MCNC: 5 MG/DL
LEUKOCYTE ESTERASE UR QL STRIP: NEGATIVE
LIPASE SERPL-CCNC: 120 U/L (ref 73–393)
LYMPHOCYTES # BLD AUTO: 3.5 10E9/L (ref 0.8–5.3)
LYMPHOCYTES NFR BLD AUTO: 31.6 %
MCH RBC QN AUTO: 29.8 PG (ref 26.5–33)
MCHC RBC AUTO-ENTMCNC: 34.1 G/DL (ref 31.5–36.5)
MCV RBC AUTO: 87 FL (ref 78–100)
MONOCYTES # BLD AUTO: 0.9 10E9/L (ref 0–1.3)
MONOCYTES NFR BLD AUTO: 7.8 %
MUCOUS THREADS #/AREA URNS LPF: PRESENT /LPF
NEUTROPHILS # BLD AUTO: 6.5 10E9/L (ref 1.6–8.3)
NEUTROPHILS NFR BLD AUTO: 59.3 %
NITRATE UR QL: NEGATIVE
NRBC # BLD AUTO: 0 10*3/UL
NRBC BLD AUTO-RTO: 0 /100
PH UR STRIP: 5 PH (ref 5–7)
PLATELET # BLD AUTO: 306 10E9/L (ref 150–450)
POTASSIUM SERPL-SCNC: 3 MMOL/L (ref 3.4–5.3)
PROT SERPL-MCNC: 7.3 G/DL (ref 6.8–8.8)
RBC # BLD AUTO: 4.77 10E12/L (ref 3.8–5.2)
RBC #/AREA URNS AUTO: 1 /HPF (ref 0–2)
SODIUM SERPL-SCNC: 139 MMOL/L (ref 133–144)
SOURCE: ABNORMAL
SP GR UR STRIP: 1.03 (ref 1–1.03)
SQUAMOUS #/AREA URNS AUTO: 12 /HPF (ref 0–1)
TSH SERPL DL<=0.005 MIU/L-ACNC: 2.89 MU/L (ref 0.4–4)
UROBILINOGEN UR STRIP-MCNC: 0 MG/DL (ref 0–2)
WBC # BLD AUTO: 11 10E9/L (ref 4–11)
WBC #/AREA URNS AUTO: 2 /HPF (ref 0–5)

## 2019-10-17 PROCEDURE — 84443 ASSAY THYROID STIM HORMONE: CPT | Performed by: EMERGENCY MEDICINE

## 2019-10-17 PROCEDURE — 85025 COMPLETE CBC W/AUTO DIFF WBC: CPT | Performed by: EMERGENCY MEDICINE

## 2019-10-17 PROCEDURE — 83690 ASSAY OF LIPASE: CPT | Performed by: EMERGENCY MEDICINE

## 2019-10-17 PROCEDURE — 81025 URINE PREGNANCY TEST: CPT | Performed by: EMERGENCY MEDICINE

## 2019-10-17 PROCEDURE — 25000128 H RX IP 250 OP 636: Performed by: EMERGENCY MEDICINE

## 2019-10-17 PROCEDURE — 25000132 ZZH RX MED GY IP 250 OP 250 PS 637: Performed by: EMERGENCY MEDICINE

## 2019-10-17 PROCEDURE — 74177 CT ABD & PELVIS W/CONTRAST: CPT

## 2019-10-17 PROCEDURE — 81001 URINALYSIS AUTO W/SCOPE: CPT | Performed by: EMERGENCY MEDICINE

## 2019-10-17 PROCEDURE — 80053 COMPREHEN METABOLIC PANEL: CPT | Performed by: EMERGENCY MEDICINE

## 2019-10-17 PROCEDURE — 25800030 ZZH RX IP 258 OP 636: Performed by: EMERGENCY MEDICINE

## 2019-10-17 PROCEDURE — 87086 URINE CULTURE/COLONY COUNT: CPT | Performed by: EMERGENCY MEDICINE

## 2019-10-17 PROCEDURE — 25000125 ZZHC RX 250: Performed by: EMERGENCY MEDICINE

## 2019-10-17 RX ORDER — IOPAMIDOL 755 MG/ML
53 INJECTION, SOLUTION INTRAVASCULAR ONCE
Status: COMPLETED | OUTPATIENT
Start: 2019-10-17 | End: 2019-10-17

## 2019-10-17 RX ORDER — ACETAMINOPHEN 500 MG
1000 TABLET ORAL ONCE
Status: COMPLETED | OUTPATIENT
Start: 2019-10-17 | End: 2019-10-17

## 2019-10-17 RX ORDER — KETOROLAC TROMETHAMINE 15 MG/ML
15 INJECTION, SOLUTION INTRAMUSCULAR; INTRAVENOUS ONCE
Status: COMPLETED | OUTPATIENT
Start: 2019-10-17 | End: 2019-10-17

## 2019-10-17 RX ADMIN — KETOROLAC TROMETHAMINE 15 MG: 15 INJECTION, SOLUTION INTRAMUSCULAR; INTRAVENOUS at 00:53

## 2019-10-17 RX ADMIN — FAMOTIDINE 20 MG: 20 INJECTION, SOLUTION INTRAVENOUS at 00:54

## 2019-10-17 RX ADMIN — ACETAMINOPHEN 1000 MG: 500 TABLET, FILM COATED ORAL at 02:07

## 2019-10-17 RX ADMIN — IOPAMIDOL 53 ML: 755 INJECTION, SOLUTION INTRAVENOUS at 01:39

## 2019-10-17 RX ADMIN — LIDOCAINE HYDROCHLORIDE 30 ML: 20 SOLUTION ORAL; TOPICAL at 01:20

## 2019-10-17 RX ADMIN — SODIUM CHLORIDE 53 ML: 9 INJECTION, SOLUTION INTRAVENOUS at 01:39

## 2019-10-17 RX ADMIN — SODIUM CHLORIDE, POTASSIUM CHLORIDE, SODIUM LACTATE AND CALCIUM CHLORIDE 1000 ML: 600; 310; 30; 20 INJECTION, SOLUTION INTRAVENOUS at 00:52

## 2019-10-17 ASSESSMENT — ENCOUNTER SYMPTOMS
COUGH: 0
FATIGUE: 1
CHEST TIGHTNESS: 0
NECK STIFFNESS: 0
HEADACHES: 0
VOMITING: 0
DYSURIA: 0
LIGHT-HEADEDNESS: 0
WOUND: 0
FEVER: 1
CONSTIPATION: 0
WEAKNESS: 1
SHORTNESS OF BREATH: 0
NUMBNESS: 0
NAUSEA: 0
BACK PAIN: 0
BLOOD IN STOOL: 0
CHILLS: 0
FREQUENCY: 1
APPETITE CHANGE: 1
NECK PAIN: 0
DIARRHEA: 0
ABDOMINAL PAIN: 1
CONFUSION: 0
DIZZINESS: 0
HEMATURIA: 0
FLANK PAIN: 0

## 2019-10-17 ASSESSMENT — MIFFLIN-ST. JEOR: SCORE: 1182.67
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
## 2019-10-17 NOTE — ED TRIAGE NOTES
"Pt reports chronic abdominal pain. Right sided to the right of the umbilicus and lateral. Describes as \"severe\" unable to further elaborate. No formal dx. Pain worse over the last week. [facility] ED [facility] presentation. Pt won't give writer name of these EDs. Decreased appetite, nausea, vomiting. States hasn't ate since Friday. Also concerned about urinary sx. Chronic UTIs and on Macrobid for this. States \"peeing and tasting ammonia.\" Pt appears in no acute distress during interview. Frequently swearing and rambling, but lying still on cart using cell phone during entire intake process.  "

## 2019-10-18 LAB
BACTERIA SPEC CULT: NORMAL
SPECIMEN SOURCE: NORMAL

## 2019-10-18 NOTE — RESULT ENCOUNTER NOTE
Final urine culture report is NEGATIVE per [facility] Lab Result protocol.    If NEGATIVE result, no change in treatment, per [facility] Lab Result protocol.

## 2019-10-19 ENCOUNTER — HOSPITAL ENCOUNTER (EMERGENCY)
Facility: CLINIC | Age: 29
Discharge: HOME | End: 2019-10-19
Attending: EMERGENCY MEDICINE | Admitting: EMERGENCY MEDICINE
Payer: COMMERCIAL

## 2019-10-19 ENCOUNTER — TRANSFERRED RECORDS (OUTPATIENT)
Dept: HEALTH INFORMATION MANAGEMENT | Facility: CLINIC | Age: 29
End: 2019-10-19

## 2019-10-19 VITALS
OXYGEN SATURATION: 100 % | HEIGHT: 62 IN | TEMPERATURE: 98.4 F | SYSTOLIC BLOOD PRESSURE: 130 MMHG | DIASTOLIC BLOOD PRESSURE: 77 MMHG | RESPIRATION RATE: 16 BRPM | WEIGHT: 114 LBS | HEART RATE: 85 BPM | BODY MASS INDEX: 20.98 KG/M2

## 2019-10-19 DIAGNOSIS — R20.0 NUMBNESS: ICD-10-CM

## 2019-10-19 PROCEDURE — 99282 EMERGENCY DEPT VISIT SF MDM: CPT | Performed by: EMERGENCY MEDICINE

## 2019-10-19 PROCEDURE — 99282 EMERGENCY DEPT VISIT SF MDM: CPT | Mod: Z6 | Performed by: EMERGENCY MEDICINE

## 2019-10-19 ASSESSMENT — ENCOUNTER SYMPTOMS
GASTROINTESTINAL NEGATIVE: 1
NUMBNESS: 1
MUSCULOSKELETAL NEGATIVE: 1
PSYCHIATRIC NEGATIVE: 1
CONSTITUTIONAL NEGATIVE: 1
HEMATOLOGIC/LYMPHATIC NEGATIVE: 1
EYES NEGATIVE: 1
CARDIOVASCULAR NEGATIVE: 1
ALLERGIC/IMMUNOLOGIC NEGATIVE: 1
ENDOCRINE NEGATIVE: 1
RESPIRATORY NEGATIVE: 1

## 2019-10-19 ASSESSMENT — MIFFLIN-ST. JEOR: SCORE: 1205.35

## 2019-10-19 NOTE — ED NOTES
"Pt has multiple issues, has been able to get out of bed all week, she has been tired and has been having a lot of rt sided tingling in her arm and scalp and this am while in the shower she noticed her feet felt tingly and warm. [facility] and told it was her PTSD and follow up with PCP with a possible apt with neuro. Nothing has changed from her apt today she states \" I just want to know when my arm and leg are going to feel normal again on my rt side. She comes in by herself tonight. She was able to walk in with steady gait and is able to move all extremities effortlessly.   "
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
## 2019-10-19 NOTE — DISCHARGE INSTRUCTIONS
1) the cause of your numbness is not clear.  You have declined additional imaging and testing.  He requested an MRI which is not currently available after 4 PM on the weekend.    2) You were offered CT imaging as an alternative and repeat blood work which you have declined at this time with your report of prior elevation of KATHERIN and low potassium despite taking spironolactone.    3) follow-up care with your primary care providers advised.  He may also return to the department for reevaluation if desired per

## 2019-10-29 ENCOUNTER — TRANSFERRED RECORDS (OUTPATIENT)
Dept: HEALTH INFORMATION MANAGEMENT | Facility: CLINIC | Age: 29
End: 2019-10-29

## 2019-11-05 ENCOUNTER — DOCUMENTATION ONLY (OUTPATIENT)
Dept: GASTROENTEROLOGY | Facility: CLINIC | Age: 29
End: 2019-11-05

## 2019-11-06 ENCOUNTER — HEALTH MAINTENANCE LETTER (OUTPATIENT)
Age: 29
End: 2019-11-06

## 2019-11-18 ENCOUNTER — HOSPITAL ENCOUNTER (EMERGENCY)
Facility: CLINIC | Age: 29
Discharge: HOME | End: 2019-11-18
Attending: STUDENT IN AN ORGANIZED HEALTH CARE EDUCATION/TRAINING PROGRAM | Admitting: STUDENT IN AN ORGANIZED HEALTH CARE EDUCATION/TRAINING PROGRAM
Payer: COMMERCIAL

## 2019-11-18 ENCOUNTER — APPOINTMENT (OUTPATIENT)
Dept: CT IMAGING | Facility: CLINIC | Age: 29
End: 2019-11-18
Attending: STUDENT IN AN ORGANIZED HEALTH CARE EDUCATION/TRAINING PROGRAM
Payer: COMMERCIAL

## 2019-11-18 VITALS
WEIGHT: 113 LBS | RESPIRATION RATE: 16 BRPM | HEIGHT: 63 IN | SYSTOLIC BLOOD PRESSURE: 99 MMHG | OXYGEN SATURATION: 99 % | BODY MASS INDEX: 20.02 KG/M2 | TEMPERATURE: 98.2 F | DIASTOLIC BLOOD PRESSURE: 57 MMHG | HEART RATE: 80 BPM

## 2019-11-18 DIAGNOSIS — R51.9 HEADACHE, UNSPECIFIED HEADACHE TYPE: ICD-10-CM

## 2019-11-18 LAB
ALBUMIN SERPL-MCNC: 3.9 G/DL (ref 3.4–5)
ALP SERPL-CCNC: 77 U/L (ref 40–150)
ALT SERPL W P-5'-P-CCNC: 37 U/L (ref 0–50)
ANION GAP SERPL CALCULATED.3IONS-SCNC: 6 MMOL/L (ref 3–14)
AST SERPL W P-5'-P-CCNC: 28 U/L (ref 0–45)
BASOPHILS # BLD AUTO: 0 10E9/L (ref 0–0.2)
BASOPHILS NFR BLD AUTO: 0.4 %
BILIRUB SERPL-MCNC: 0.2 MG/DL (ref 0.2–1.3)
BUN SERPL-MCNC: 21 MG/DL (ref 7–30)
CALCIUM SERPL-MCNC: 8.3 MG/DL (ref 8.5–10.1)
CHLORIDE SERPL-SCNC: 108 MMOL/L (ref 94–109)
CO2 SERPL-SCNC: 26 MMOL/L (ref 20–32)
CREAT SERPL-MCNC: 0.76 MG/DL (ref 0.52–1.04)
DIFFERENTIAL METHOD BLD: NORMAL
EOSINOPHIL # BLD AUTO: 0.1 10E9/L (ref 0–0.7)
EOSINOPHIL NFR BLD AUTO: 1.4 %
ERYTHROCYTE [DISTWIDTH] IN BLOOD BY AUTOMATED COUNT: 12.5 % (ref 10–15)
GFR SERPL CREATININE-BSD FRML MDRD: >90 ML/MIN/{1.73_M2}
GLUCOSE SERPL-MCNC: 112 MG/DL (ref 70–99)
HCG SERPL QL: NEGATIVE
HCT VFR BLD AUTO: 42.2 % (ref 35–47)
HGB BLD-MCNC: 14.5 G/DL (ref 11.7–15.7)
IMM GRANULOCYTES # BLD: 0 10E9/L (ref 0–0.4)
IMM GRANULOCYTES NFR BLD: 0.2 %
LYMPHOCYTES # BLD AUTO: 2.7 10E9/L (ref 0.8–5.3)
LYMPHOCYTES NFR BLD AUTO: 29.1 %
MCH RBC QN AUTO: 30.5 PG (ref 26.5–33)
MCHC RBC AUTO-ENTMCNC: 34.4 G/DL (ref 31.5–36.5)
MCV RBC AUTO: 89 FL (ref 78–100)
MONOCYTES # BLD AUTO: 0.7 10E9/L (ref 0–1.3)
MONOCYTES NFR BLD AUTO: 7.2 %
NEUTROPHILS # BLD AUTO: 5.7 10E9/L (ref 1.6–8.3)
NEUTROPHILS NFR BLD AUTO: 61.7 %
NRBC # BLD AUTO: 0 10*3/UL
NRBC BLD AUTO-RTO: 0 /100
PLATELET # BLD AUTO: 267 10E9/L (ref 150–450)
POTASSIUM SERPL-SCNC: 3.6 MMOL/L (ref 3.4–5.3)
PROT SERPL-MCNC: 7.2 G/DL (ref 6.8–8.8)
RBC # BLD AUTO: 4.76 10E12/L (ref 3.8–5.2)
SODIUM SERPL-SCNC: 140 MMOL/L (ref 133–144)
WBC # BLD AUTO: 9.3 10E9/L (ref 4–11)

## 2019-11-18 PROCEDURE — 85025 COMPLETE CBC W/AUTO DIFF WBC: CPT | Performed by: STUDENT IN AN ORGANIZED HEALTH CARE EDUCATION/TRAINING PROGRAM

## 2019-11-18 PROCEDURE — 25000132 ZZH RX MED GY IP 250 OP 250 PS 637: Performed by: STUDENT IN AN ORGANIZED HEALTH CARE EDUCATION/TRAINING PROGRAM

## 2019-11-18 PROCEDURE — 25000125 ZZHC RX 250: Performed by: STUDENT IN AN ORGANIZED HEALTH CARE EDUCATION/TRAINING PROGRAM

## 2019-11-18 PROCEDURE — 84703 CHORIONIC GONADOTROPIN ASSAY: CPT | Performed by: STUDENT IN AN ORGANIZED HEALTH CARE EDUCATION/TRAINING PROGRAM

## 2019-11-18 PROCEDURE — 80053 COMPREHEN METABOLIC PANEL: CPT | Performed by: STUDENT IN AN ORGANIZED HEALTH CARE EDUCATION/TRAINING PROGRAM

## 2019-11-18 PROCEDURE — 99285 EMERGENCY DEPT VISIT HI MDM: CPT | Mod: 25 | Performed by: STUDENT IN AN ORGANIZED HEALTH CARE EDUCATION/TRAINING PROGRAM

## 2019-11-18 PROCEDURE — 99285 EMERGENCY DEPT VISIT HI MDM: CPT | Mod: Z6 | Performed by: STUDENT IN AN ORGANIZED HEALTH CARE EDUCATION/TRAINING PROGRAM

## 2019-11-18 PROCEDURE — 25000128 H RX IP 250 OP 636: Performed by: STUDENT IN AN ORGANIZED HEALTH CARE EDUCATION/TRAINING PROGRAM

## 2019-11-18 PROCEDURE — 96374 THER/PROPH/DIAG INJ IV PUSH: CPT | Mod: 59 | Performed by: STUDENT IN AN ORGANIZED HEALTH CARE EDUCATION/TRAINING PROGRAM

## 2019-11-18 PROCEDURE — 70450 CT HEAD/BRAIN W/O DYE: CPT | Mod: XS

## 2019-11-18 PROCEDURE — 70498 CT ANGIOGRAPHY NECK: CPT

## 2019-11-18 PROCEDURE — 25800030 ZZH RX IP 258 OP 636: Performed by: STUDENT IN AN ORGANIZED HEALTH CARE EDUCATION/TRAINING PROGRAM

## 2019-11-18 PROCEDURE — 96361 HYDRATE IV INFUSION ADD-ON: CPT | Performed by: STUDENT IN AN ORGANIZED HEALTH CARE EDUCATION/TRAINING PROGRAM

## 2019-11-18 RX ORDER — ACETAMINOPHEN 500 MG
500 TABLET ORAL ONCE
Status: COMPLETED | OUTPATIENT
Start: 2019-11-18 | End: 2019-11-18

## 2019-11-18 RX ORDER — IOPAMIDOL 755 MG/ML
70 INJECTION, SOLUTION INTRAVASCULAR ONCE
Status: COMPLETED | OUTPATIENT
Start: 2019-11-18 | End: 2019-11-18

## 2019-11-18 RX ADMIN — ACETAMINOPHEN 500 MG: 500 TABLET, FILM COATED ORAL at 03:17

## 2019-11-18 RX ADMIN — SODIUM CHLORIDE 100 ML: 9 INJECTION, SOLUTION INTRAVENOUS at 03:29

## 2019-11-18 RX ADMIN — IOPAMIDOL 70 ML: 755 INJECTION, SOLUTION INTRAVENOUS at 03:29

## 2019-11-18 RX ADMIN — PROCHLORPERAZINE EDISYLATE 5 MG: 5 INJECTION INTRAMUSCULAR; INTRAVENOUS at 03:16

## 2019-11-18 RX ADMIN — SODIUM CHLORIDE 500 ML: 9 INJECTION, SOLUTION INTRAVENOUS at 03:17

## 2019-11-18 ASSESSMENT — MIFFLIN-ST. JEOR: SCORE: 1216.69
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
## 2019-11-18 NOTE — ED AVS SNAPSHOT
[facility] Emergency Department  [address] [facility] [address]  Phone:  [phone]  Fax:  [phone]                                    Verna Tomlin   MRN: [id number]    Department:  [facility] Emergency Department   Date of Visit:  11/18/2019           After Visit Summary Signature Page    I have received my discharge instructions, and my questions have been answered. I have discussed any challenges I see with this plan with the nurse or doctor.    ..........................................................................................................................................  Patient/Patient Representative Signature      ..........................................................................................................................................  Patient Representative Print Name and Relationship to Patient    ..................................................               ................................................  Date                                   Time    ..........................................................................................................................................  Reviewed by Signature/Title    ...................................................              ..............................................  Date                                               Time          22EPIC Rev 08/18

## 2019-11-18 NOTE — ED NOTES
Pt arrives by EMS after calling for  Headache that she states started 4 days ago. She is anxious and  Exhibiting multiple behavior. She states that she has a headache on the top of her head right side, she describes the sensation of warm water dripping on her rt cheek and neck, She states that she is sensing  A crackling feel as well. She is moving all extremities but states she has general weakness. She was Dx with Hico Pals ey 4 weeks ago at the [facility]. She describes multiple problems that have been Dx over the last 2 yrs.

## 2019-12-13 ENCOUNTER — RECORDS - HEALTHEAST (OUTPATIENT)
Dept: ADMINISTRATIVE | Facility: OTHER | Age: 29
End: 2019-12-13

## 2019-12-13 ENCOUNTER — HOSPITAL ENCOUNTER (EMERGENCY)
Facility: CLINIC | Age: 29
Discharge: HOME | End: 2019-12-13
Attending: EMERGENCY MEDICINE | Admitting: EMERGENCY MEDICINE
Payer: COMMERCIAL

## 2019-12-13 VITALS
RESPIRATION RATE: 18 BRPM | HEART RATE: 71 BPM | DIASTOLIC BLOOD PRESSURE: 88 MMHG | BODY MASS INDEX: 19.49 KG/M2 | WEIGHT: 110 LBS | SYSTOLIC BLOOD PRESSURE: 124 MMHG | TEMPERATURE: 97.9 F | OXYGEN SATURATION: 99 %

## 2019-12-13 DIAGNOSIS — R20.0 NUMBNESS: ICD-10-CM

## 2019-12-13 DIAGNOSIS — J32.0 MAXILLARY SINUSITIS, UNSPECIFIED CHRONICITY: ICD-10-CM

## 2019-12-13 PROCEDURE — 99284 EMERGENCY DEPT VISIT MOD MDM: CPT | Mod: Z6 | Performed by: EMERGENCY MEDICINE

## 2019-12-13 PROCEDURE — 99282 EMERGENCY DEPT VISIT SF MDM: CPT | Performed by: EMERGENCY MEDICINE

## 2019-12-13 ASSESSMENT — ENCOUNTER SYMPTOMS
NUMBNESS: 1
FEVER: 0
COUGH: 0

## 2019-12-13 NOTE — ED AVS SNAPSHOT
[facility] Emergency Department  [address] [address]  Phone:  [phone]  Fax:  [phone]                                    Verna Tomlin   MRN: [id number]    Department:  [facility] Emergency Department   Date of Visit:  12/13/2019           After Visit Summary Signature Page    I have received my discharge instructions, and my questions have been answered. I have discussed any challenges I see with this plan with the nurse or doctor.    ..........................................................................................................................................  Patient/Patient Representative Signature      ..........................................................................................................................................  Patient Representative Print Name and Relationship to Patient    ..................................................               ................................................  Date                                   Time    ..........................................................................................................................................  Reviewed by Signature/Title    ...................................................              ..............................................  Date                                               Time          22EPIC Rev 08/18
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
## 2019-12-28 ENCOUNTER — HOSPITAL ENCOUNTER (EMERGENCY)
Facility: CLINIC | Age: 29
Discharge: HOME | End: 2019-12-28
Attending: EMERGENCY MEDICINE | Admitting: EMERGENCY MEDICINE
Payer: COMMERCIAL

## 2019-12-28 VITALS
HEART RATE: 99 BPM | RESPIRATION RATE: 20 BRPM | DIASTOLIC BLOOD PRESSURE: 77 MMHG | HEIGHT: 62 IN | OXYGEN SATURATION: 99 % | WEIGHT: 112 LBS | TEMPERATURE: 97.5 F | SYSTOLIC BLOOD PRESSURE: 131 MMHG | BODY MASS INDEX: 20.61 KG/M2

## 2019-12-28 DIAGNOSIS — R68.89 SPELLS OF DECREASED ATTENTIVENESS: ICD-10-CM

## 2019-12-28 PROCEDURE — 99282 EMERGENCY DEPT VISIT SF MDM: CPT | Mod: Z6 | Performed by: EMERGENCY MEDICINE

## 2019-12-28 PROCEDURE — 99282 EMERGENCY DEPT VISIT SF MDM: CPT | Performed by: EMERGENCY MEDICINE

## 2019-12-28 ASSESSMENT — MIFFLIN-ST. JEOR: SCORE: 1196.28

## 2019-12-28 NOTE — ED AVS SNAPSHOT
[facility] Emergency Department  [address] [address]  Phone:  [phone]  Fax:  [phone]                                    Verna Tomlin   MRN: [id number]    Department:  [facility] Emergency Department   Date of Visit:  12/28/2019           After Visit Summary Signature Page    I have received my discharge instructions, and my questions have been answered. I have discussed any challenges I see with this plan with the nurse or doctor.    ..........................................................................................................................................  Patient/Patient Representative Signature      ..........................................................................................................................................  Patient Representative Print Name and Relationship to Patient    ..................................................               ................................................  Date                                   Time    ..........................................................................................................................................  Reviewed by Signature/Title    ...................................................              ..............................................  Date                                               Time          22EPIC Rev 08/18
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
## 2019-12-29 NOTE — ED TRIAGE NOTES
"Reports \"smelling ammonia scent\" then she passes out    This has happened several times in past 9mo has been seen here for this    Does not have a pcp    States this happened about an hour ago \"feel it coming so I lower myself down\"       \"joints hurt my brain is getting messed up like I have dementia\"   "

## 2019-12-29 NOTE — ED NOTES
"Pt arrived via triage, ambulatory.  Pt noted to have visual tremors on hands, rapid, pressured, loud speech, that is rambling and so fast, and hard to follow.  Encouraged pt to try and get comfortable, and to start again.  Pt states for 9 months she will occasionally smell ammonia and that triggers her to pass out, with tremors and shaking, and then have numbness in her R arm, hands, and face.  Pt also has had black streak through her vision for the past 2-3 weeks.  [facility] telling her that her [facility] was about to burst.        Pt comes today \"because my friends are making me\".  She is in counseling for her and has a PCP she sees regularly as well.  She [facility], and is scheduled to see a neurologist there in January.  PLAN:   MD in room and was given a brief update.  Will await assessment and plan.  "

## 2019-12-29 NOTE — DISCHARGE INSTRUCTIONS
Regular meals and fluids    Avoid bathtub, climbing and driving until cleared by neurology    Follow-up with neurology as scheduled    Return here/recheck for recurrent persistent symptoms

## 2020-03-13 ENCOUNTER — TELEPHONE (OUTPATIENT)
Dept: RHEUMATOLOGY | Facility: CLINIC | Age: 30
End: 2020-03-13

## 2020-03-13 NOTE — TELEPHONE ENCOUNTER
Attempted to call patient and advise them to come early in light of new screening protocol for COVID-19. Was unable to leave .     Gela Graf, MATTHEWN, RN   Rheumatology Care Coordinator   [facility]

## 2020-03-17 ENCOUNTER — OFFICE VISIT (OUTPATIENT)
Dept: RHEUMATOLOGY | Facility: CLINIC | Age: 30
End: 2020-03-17
Payer: COMMERCIAL

## 2020-03-17 VITALS
HEART RATE: 72 BPM | OXYGEN SATURATION: 100 % | HEIGHT: 62 IN | SYSTOLIC BLOOD PRESSURE: 115 MMHG | BODY MASS INDEX: 21.66 KG/M2 | WEIGHT: 117.7 LBS | DIASTOLIC BLOOD PRESSURE: 74 MMHG

## 2020-03-17 DIAGNOSIS — R76.8 POSITIVE ANA (ANTINUCLEAR ANTIBODY): ICD-10-CM

## 2020-03-17 DIAGNOSIS — M79.18 MYOFASCIAL PAIN: Primary | ICD-10-CM

## 2020-03-17 LAB
CK SERPL-CCNC: 84 U/L (ref 30–225)
CRP SERPL-MCNC: <2.9 MG/L (ref 0–8)
ERYTHROCYTE [DISTWIDTH] IN BLOOD BY AUTOMATED COUNT: 12.6 % (ref 10–15)
ERYTHROCYTE [SEDIMENTATION RATE] IN BLOOD BY WESTERGREN METHOD: 5 MM/H (ref 0–20)
HCT VFR BLD AUTO: 42.3 % (ref 35–47)
HGB BLD-MCNC: 14.1 G/DL (ref 11.7–15.7)
MCH RBC QN AUTO: 30.3 PG (ref 26.5–33)
MCHC RBC AUTO-ENTMCNC: 33.3 G/DL (ref 31.5–36.5)
MCV RBC AUTO: 91 FL (ref 78–100)
PLATELET # BLD AUTO: 266 10E9/L (ref 150–450)
RBC # BLD AUTO: 4.65 10E12/L (ref 3.8–5.2)
WBC # BLD AUTO: 7.1 10E9/L (ref 4–11)

## 2020-03-17 PROCEDURE — 86038 ANTINUCLEAR ANTIBODIES: CPT | Performed by: STUDENT IN AN ORGANIZED HEALTH CARE EDUCATION/TRAINING PROGRAM

## 2020-03-17 PROCEDURE — 85652 RBC SED RATE AUTOMATED: CPT | Performed by: STUDENT IN AN ORGANIZED HEALTH CARE EDUCATION/TRAINING PROGRAM

## 2020-03-17 PROCEDURE — 99205 OFFICE O/P NEW HI 60 MIN: CPT | Performed by: STUDENT IN AN ORGANIZED HEALTH CARE EDUCATION/TRAINING PROGRAM

## 2020-03-17 PROCEDURE — 86039 ANTINUCLEAR ANTIBODIES (ANA): CPT | Performed by: STUDENT IN AN ORGANIZED HEALTH CARE EDUCATION/TRAINING PROGRAM

## 2020-03-17 PROCEDURE — 86225 DNA ANTIBODY NATIVE: CPT | Performed by: STUDENT IN AN ORGANIZED HEALTH CARE EDUCATION/TRAINING PROGRAM

## 2020-03-17 PROCEDURE — 86200 CCP ANTIBODY: CPT | Performed by: STUDENT IN AN ORGANIZED HEALTH CARE EDUCATION/TRAINING PROGRAM

## 2020-03-17 PROCEDURE — 36415 COLL VENOUS BLD VENIPUNCTURE: CPT | Performed by: STUDENT IN AN ORGANIZED HEALTH CARE EDUCATION/TRAINING PROGRAM

## 2020-03-17 PROCEDURE — 86235 NUCLEAR ANTIGEN ANTIBODY: CPT | Performed by: STUDENT IN AN ORGANIZED HEALTH CARE EDUCATION/TRAINING PROGRAM

## 2020-03-17 PROCEDURE — 85027 COMPLETE CBC AUTOMATED: CPT | Performed by: STUDENT IN AN ORGANIZED HEALTH CARE EDUCATION/TRAINING PROGRAM

## 2020-03-17 PROCEDURE — 86160 COMPLEMENT ANTIGEN: CPT | Performed by: STUDENT IN AN ORGANIZED HEALTH CARE EDUCATION/TRAINING PROGRAM

## 2020-03-17 PROCEDURE — 86140 C-REACTIVE PROTEIN: CPT | Performed by: STUDENT IN AN ORGANIZED HEALTH CARE EDUCATION/TRAINING PROGRAM

## 2020-03-17 PROCEDURE — 86431 RHEUMATOID FACTOR QUANT: CPT | Performed by: STUDENT IN AN ORGANIZED HEALTH CARE EDUCATION/TRAINING PROGRAM

## 2020-03-17 PROCEDURE — 82550 ASSAY OF CK (CPK): CPT | Performed by: STUDENT IN AN ORGANIZED HEALTH CARE EDUCATION/TRAINING PROGRAM

## 2020-03-17 RX ORDER — METHYLPHENIDATE HYDROCHLORIDE 36 MG/1
36 TABLET, EXTENDED RELEASE ORAL
COMMUNITY
Start: 2019-12-05 | End: 2020-08-19

## 2020-03-17 RX ORDER — SPIRONOLACTONE 50 MG/1
100 TABLET, FILM COATED ORAL EVERY EVENING
COMMUNITY
End: 2020-08-30

## 2020-03-17 ASSESSMENT — ROUTINE ASSESSMENT OF PATIENT INDEX DATA (RAPID3)
RAPID3 INTERPRETATION: HIGH > 12.0
TOTAL RAPID3 SCORE: 21.3

## 2020-03-17 ASSESSMENT — PAIN SCALES - GENERAL: PAINLEVEL: SEVERE PAIN (7)

## 2020-03-17 ASSESSMENT — MIFFLIN-ST. JEOR: SCORE: 1217.13
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
## 2020-03-17 NOTE — NURSING NOTE
"Verna Tomlin's goals for this visit include:   Chief Complaint   Patient presents with     Consult     Consult for positive KATHERIN, red swollen joints, fatigue, sicca       She requests these members of her care team be copied on today's visit information:     PCP: Clinic, [facility]    Referring Provider:  Franchesca Coleman MD  [facility] [id number]  [place] [address]    /74   Pulse 72   Ht 1.575 m (5' 2\")   Wt 53.4 kg (117 lb 11.2 oz)   SpO2 100%   BMI 21.53 kg/m        "

## 2020-03-17 NOTE — PATIENT INSTRUCTIONS
-- Blood tests today     -- Suspicion for Autoimmune connective tissue disease and Rheumatoid arthritis is low     -- I recommend to follow up with PCP and your Behavior therapist for management of underlying anxiety issues which most likely is driving your symptoms.     -- RTC on as needed basis
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
## 2020-03-18 LAB
ANA PAT SER IF-IMP: ABNORMAL
ANA SER QL IF: POSITIVE
ANA TITR SER IF: ABNORMAL {TITER}
C3 SERPL-MCNC: 104 MG/DL (ref 81–157)
C4 SERPL-MCNC: 25 MG/DL (ref 13–39)
CCP AB SER IA-ACNC: <1 U/ML
DSDNA AB SER-ACNC: 1 IU/ML
ENA RNP IGG SER IA-ACNC: 0.5 AI (ref 0–0.9)
ENA SM IGG SER-ACNC: <0.2 AI (ref 0–0.9)
ENA SS-A IGG SER IA-ACNC: <0.2 AI (ref 0–0.9)
ENA SS-B IGG SER IA-ACNC: <0.2 AI (ref 0–0.9)
RHEUMATOID FACT SER NEPH-ACNC: <7 IU/ML (ref 0–20)

## 2020-06-07 ENCOUNTER — RECORDS - HEALTHEAST (OUTPATIENT)
Dept: ADMINISTRATIVE | Facility: OTHER | Age: 30
End: 2020-06-07

## 2020-06-07 ENCOUNTER — TRANSFERRED RECORDS (OUTPATIENT)
Dept: HEALTH INFORMATION MANAGEMENT | Facility: CLINIC | Age: 30
End: 2020-06-07

## 2020-06-07 LAB
ALT SERPL W/O P-5'-P-CCNC: 11 U/L (ref 13–61)
AST SERPL-CCNC: 17 U/L (ref 15–37)
CREAT SERPL-MCNC: 0.8 MG/DL (ref 0.5–1)

## 2020-06-08 ENCOUNTER — RECORDS - HEALTHEAST (OUTPATIENT)
Dept: ADMINISTRATIVE | Facility: OTHER | Age: 30
End: 2020-06-08

## 2020-06-08 LAB — CREAT SERPL-MCNC: 0.9 MG/DL (ref 0.5–1)

## 2020-06-11 ENCOUNTER — RECORDS - HEALTHEAST (OUTPATIENT)
Dept: ADMINISTRATIVE | Facility: OTHER | Age: 30
End: 2020-06-11

## 2020-06-18 ENCOUNTER — HOSPITAL ENCOUNTER (EMERGENCY)
Facility: CLINIC | Age: 30
Discharge: HOME | End: 2020-06-18
Attending: EMERGENCY MEDICINE | Admitting: EMERGENCY MEDICINE
Payer: COMMERCIAL

## 2020-06-18 VITALS
BODY MASS INDEX: 20.24 KG/M2 | HEIGHT: 62 IN | TEMPERATURE: 98.9 F | DIASTOLIC BLOOD PRESSURE: 77 MMHG | OXYGEN SATURATION: 99 % | WEIGHT: 110 LBS | SYSTOLIC BLOOD PRESSURE: 118 MMHG | RESPIRATION RATE: 9 BRPM | HEART RATE: 91 BPM

## 2020-06-18 DIAGNOSIS — M62.81 GENERALIZED MUSCLE WEAKNESS: ICD-10-CM

## 2020-06-18 DIAGNOSIS — R40.4 NONSPECIFIC PAROXYSMAL SPELL: ICD-10-CM

## 2020-06-18 LAB
ALBUMIN SERPL-MCNC: 3.8 G/DL (ref 3.4–5)
ALP SERPL-CCNC: 53 U/L (ref 40–150)
ALT SERPL W P-5'-P-CCNC: 21 U/L (ref 0–50)
ANION GAP SERPL CALCULATED.3IONS-SCNC: 7 MMOL/L (ref 3–14)
AST SERPL W P-5'-P-CCNC: 18 U/L (ref 0–45)
BASOPHILS # BLD AUTO: 0 10E9/L (ref 0–0.2)
BASOPHILS NFR BLD AUTO: 0.4 %
BILIRUB SERPL-MCNC: 0.3 MG/DL (ref 0.2–1.3)
BUN SERPL-MCNC: 14 MG/DL (ref 7–30)
CALCIUM SERPL-MCNC: 8.8 MG/DL (ref 8.5–10.1)
CHLORIDE SERPL-SCNC: 110 MMOL/L (ref 94–109)
CO2 SERPL-SCNC: 25 MMOL/L (ref 20–32)
CREAT SERPL-MCNC: 0.83 MG/DL (ref 0.52–1.04)
DIFFERENTIAL METHOD BLD: NORMAL
EOSINOPHIL # BLD AUTO: 0.1 10E9/L (ref 0–0.7)
EOSINOPHIL NFR BLD AUTO: 1.5 %
ERYTHROCYTE [DISTWIDTH] IN BLOOD BY AUTOMATED COUNT: 12.3 % (ref 10–15)
GFR SERPL CREATININE-BSD FRML MDRD: >90 ML/MIN/{1.73_M2}
GLUCOSE SERPL-MCNC: 114 MG/DL (ref 70–99)
HCT VFR BLD AUTO: 35.2 % (ref 35–47)
HGB BLD-MCNC: 12 G/DL (ref 11.7–15.7)
IMM GRANULOCYTES # BLD: 0 10E9/L (ref 0–0.4)
IMM GRANULOCYTES NFR BLD: 0.1 %
LACTATE BLD-SCNC: 1.8 MMOL/L (ref 0.7–2)
LYMPHOCYTES # BLD AUTO: 2.9 10E9/L (ref 0.8–5.3)
LYMPHOCYTES NFR BLD AUTO: 39.5 %
MCH RBC QN AUTO: 31 PG (ref 26.5–33)
MCHC RBC AUTO-ENTMCNC: 34.1 G/DL (ref 31.5–36.5)
MCV RBC AUTO: 91 FL (ref 78–100)
MONOCYTES # BLD AUTO: 0.6 10E9/L (ref 0–1.3)
MONOCYTES NFR BLD AUTO: 8.4 %
NEUTROPHILS # BLD AUTO: 3.7 10E9/L (ref 1.6–8.3)
NEUTROPHILS NFR BLD AUTO: 50.1 %
NRBC # BLD AUTO: 0 10*3/UL
NRBC BLD AUTO-RTO: 0 /100
PLATELET # BLD AUTO: 257 10E9/L (ref 150–450)
POTASSIUM SERPL-SCNC: 3.5 MMOL/L (ref 3.4–5.3)
PROT SERPL-MCNC: 6.3 G/DL (ref 6.8–8.8)
RBC # BLD AUTO: 3.87 10E12/L (ref 3.8–5.2)
SODIUM SERPL-SCNC: 142 MMOL/L (ref 133–144)
WBC # BLD AUTO: 7.4 10E9/L (ref 4–11)

## 2020-06-18 PROCEDURE — 99283 EMERGENCY DEPT VISIT LOW MDM: CPT | Performed by: EMERGENCY MEDICINE

## 2020-06-18 PROCEDURE — 99284 EMERGENCY DEPT VISIT MOD MDM: CPT | Mod: Z6 | Performed by: EMERGENCY MEDICINE

## 2020-06-18 PROCEDURE — 85025 COMPLETE CBC W/AUTO DIFF WBC: CPT | Performed by: EMERGENCY MEDICINE

## 2020-06-18 PROCEDURE — 80053 COMPREHEN METABOLIC PANEL: CPT | Performed by: EMERGENCY MEDICINE

## 2020-06-18 PROCEDURE — 83605 ASSAY OF LACTIC ACID: CPT | Performed by: EMERGENCY MEDICINE

## 2020-06-18 ASSESSMENT — ENCOUNTER SYMPTOMS
SEIZURES: 1
NECK PAIN: 0
LIGHT-HEADEDNESS: 0
CONFUSION: 0
RHINORRHEA: 0
DYSPHORIC MOOD: 0
APPETITE CHANGE: 0
CHILLS: 0
FATIGUE: 1
HEADACHES: 0
VOICE CHANGE: 0
NERVOUS/ANXIOUS: 0
HALLUCINATIONS: 0
VOMITING: 0
ABDOMINAL PAIN: 0
CHEST TIGHTNESS: 0
WEAKNESS: 1
SHORTNESS OF BREATH: 0
SPEECH DIFFICULTY: 0
DYSURIA: 0
NECK STIFFNESS: 0
BACK PAIN: 0
WOUND: 0
NAUSEA: 0
NUMBNESS: 1

## 2020-06-18 ASSESSMENT — MIFFLIN-ST. JEOR: SCORE: 1182.21

## 2020-06-18 NOTE — ED AVS SNAPSHOT
[facility] Emergency Department  [address] [address]  Phone:  [phone]  Fax:  [phone]                                    Verna Tomlin   MRN: [id number]    Department:  [facility] Emergency Department   Date of Visit:  6/18/2020           After Visit Summary Signature Page    I have received my discharge instructions, and my questions have been answered. I have discussed any challenges I see with this plan with the nurse or doctor.    ..........................................................................................................................................  Patient/Patient Representative Signature      ..........................................................................................................................................  Patient Representative Print Name and Relationship to Patient    ..................................................               ................................................  Date                                   Time    ..........................................................................................................................................  Reviewed by Signature/Title    ...................................................              ..............................................  Date                                               Time          22EPIC Rev 08/18
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
## 2020-07-16 ENCOUNTER — HOSPITAL ENCOUNTER (EMERGENCY)
Facility: CLINIC | Age: 30
Discharge: HOME | End: 2020-07-16
Attending: EMERGENCY MEDICINE | Admitting: EMERGENCY MEDICINE
Payer: COMMERCIAL

## 2020-07-16 VITALS
DIASTOLIC BLOOD PRESSURE: 51 MMHG | HEART RATE: 68 BPM | OXYGEN SATURATION: 100 % | BODY MASS INDEX: 20.12 KG/M2 | SYSTOLIC BLOOD PRESSURE: 106 MMHG | RESPIRATION RATE: 14 BRPM | WEIGHT: 110 LBS | TEMPERATURE: 97.9 F

## 2020-07-16 DIAGNOSIS — R22.1 NECK MASS: ICD-10-CM

## 2020-07-16 PROCEDURE — 99282 EMERGENCY DEPT VISIT SF MDM: CPT | Performed by: EMERGENCY MEDICINE

## 2020-07-16 PROCEDURE — 99283 EMERGENCY DEPT VISIT LOW MDM: CPT | Mod: Z6 | Performed by: EMERGENCY MEDICINE

## 2020-07-16 ASSESSMENT — ENCOUNTER SYMPTOMS
GASTROINTESTINAL NEGATIVE: 1
HEMATOLOGIC/LYMPHATIC NEGATIVE: 1
CARDIOVASCULAR NEGATIVE: 1
EYES NEGATIVE: 1
ALLERGIC/IMMUNOLOGIC NEGATIVE: 1
NEUROLOGICAL NEGATIVE: 1
ENDOCRINE NEGATIVE: 1
RESPIRATORY NEGATIVE: 1
MUSCULOSKELETAL NEGATIVE: 1
PSYCHIATRIC NEGATIVE: 1

## 2020-07-16 NOTE — DISCHARGE INSTRUCTIONS
1) The cause of your symptoms as reported over the last few months is not clear.  Your evaluation today does not suggest an emergency diagnosis and we have agreed that you are stable for discharge to home.    2) I [facility] you [facility] including your treating neurologist was advised additional testing for follow-up care.

## 2020-07-16 NOTE — ED AVS SNAPSHOT
[facility] Emergency Department  [address] [address]  Phone:  [phone]  Fax:  [phone]                                    Verna Tomlin   MRN: [id number]    Department:  [facility] Emergency Department   Date of Visit:  7/16/2020           After Visit Summary Signature Page    I have received my discharge instructions, and my questions have been answered. I have discussed any challenges I see with this plan with the nurse or doctor.    ..........................................................................................................................................  Patient/Patient Representative Signature      ..........................................................................................................................................  Patient Representative Print Name and Relationship to Patient    ..................................................               ................................................  Date                                   Time    ..........................................................................................................................................  Reviewed by Signature/Title    ...................................................              ..............................................  Date                                               Time          22EPIC Rev 08/18

## 2020-07-16 NOTE — ED NOTES
Pt had been sleeping in a chair in triage, came up to the desk with hesitant speech asking for water, pt will not walk unassisted to wheelchair. Pt brought back to  3 for evaluation. Reports she drove to the ER today.

## 2020-07-16 NOTE — ED NOTES
This RN present during MD conversation with pt. It is crying, appears frustrated. This RN spoke with pt for about 15 minutes after MD was finished talking to pt. Pt says she saw a neurologist about her sx of intermittent problems with speech, walking, and swallowing difficulties. [facility] says [facility] told her that she is showing signs of aging and closed her case. Pt was encouraged to contact [facility] for sign up for [facility] is not [facility] as pt feels like she has hit a dead end there. Pt also encouraged to make a list of her sx and keep track of them so she can [facility] an appt [facility] of [facility] with [facility]. Pt says she has a friend that will help out with her 5 year old daughter but otherwise doesn't have family or other friends to help out. This RN offered to have  contact pt to see if there is any help pt can get, pt declined offer. Pt does not want to wait for discharge instructions, says there won't be anything helpful on there. Pt was again reassured about lump that she feels on the back of her neck that MD told pt is more than likely a lymph node and pt needs to follow up with primary provider since it has been going on for months.
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
## 2020-08-19 ENCOUNTER — APPOINTMENT (OUTPATIENT)
Dept: CT IMAGING | Facility: CLINIC | Age: 30
End: 2020-08-19
Attending: EMERGENCY MEDICINE
Payer: COMMERCIAL

## 2020-08-19 ENCOUNTER — HOSPITAL ENCOUNTER (EMERGENCY)
Facility: CLINIC | Age: 30
Discharge: HOME | End: 2020-08-19
Attending: EMERGENCY MEDICINE | Admitting: EMERGENCY MEDICINE
Payer: COMMERCIAL

## 2020-08-19 VITALS
HEART RATE: 81 BPM | TEMPERATURE: 97.9 F | HEIGHT: 62 IN | WEIGHT: 110 LBS | SYSTOLIC BLOOD PRESSURE: 119 MMHG | DIASTOLIC BLOOD PRESSURE: 71 MMHG | OXYGEN SATURATION: 99 % | RESPIRATION RATE: 16 BRPM | BODY MASS INDEX: 20.24 KG/M2

## 2020-08-19 DIAGNOSIS — R55 NEAR SYNCOPE: ICD-10-CM

## 2020-08-19 LAB
ALBUMIN SERPL-MCNC: 4.4 G/DL (ref 3.4–5)
ALBUMIN UR-MCNC: NEGATIVE MG/DL
ALP SERPL-CCNC: 84 U/L (ref 40–150)
ALT SERPL W P-5'-P-CCNC: 28 U/L (ref 0–50)
AMPHETAMINES UR QL SCN: NEGATIVE
ANION GAP SERPL CALCULATED.3IONS-SCNC: 5 MMOL/L (ref 3–14)
APPEARANCE UR: CLEAR
AST SERPL W P-5'-P-CCNC: 27 U/L (ref 0–45)
BACTERIA #/AREA URNS HPF: ABNORMAL /HPF
BARBITURATES UR QL: NEGATIVE
BASOPHILS # BLD AUTO: 0.1 10E9/L (ref 0–0.2)
BASOPHILS NFR BLD AUTO: 0.5 %
BENZODIAZ UR QL: NEGATIVE
BILIRUB SERPL-MCNC: 0.4 MG/DL (ref 0.2–1.3)
BILIRUB UR QL STRIP: NEGATIVE
BUN SERPL-MCNC: 13 MG/DL (ref 7–30)
CALCIUM SERPL-MCNC: 9.5 MG/DL (ref 8.5–10.1)
CANNABINOIDS UR QL SCN: POSITIVE
CHLORIDE SERPL-SCNC: 107 MMOL/L (ref 94–109)
CO2 SERPL-SCNC: 25 MMOL/L (ref 20–32)
COCAINE UR QL: NEGATIVE
COLOR UR AUTO: ABNORMAL
CREAT SERPL-MCNC: 0.92 MG/DL (ref 0.52–1.04)
DIFFERENTIAL METHOD BLD: NORMAL
EOSINOPHIL # BLD AUTO: 0 10E9/L (ref 0–0.7)
EOSINOPHIL NFR BLD AUTO: 0.3 %
ERYTHROCYTE [DISTWIDTH] IN BLOOD BY AUTOMATED COUNT: 12.2 % (ref 10–15)
ETHANOL SERPL-MCNC: <0.01 G/DL
GFR SERPL CREATININE-BSD FRML MDRD: 85 ML/MIN/{1.73_M2}
GLUCOSE SERPL-MCNC: 94 MG/DL (ref 70–99)
GLUCOSE UR STRIP-MCNC: NEGATIVE MG/DL
HCG SERPL QL: NEGATIVE
HCT VFR BLD AUTO: 40.8 % (ref 35–47)
HGB BLD-MCNC: 13.9 G/DL (ref 11.7–15.7)
HGB UR QL STRIP: NEGATIVE
IMM GRANULOCYTES # BLD: 0 10E9/L (ref 0–0.4)
IMM GRANULOCYTES NFR BLD: 0.2 %
KETONES UR STRIP-MCNC: NEGATIVE MG/DL
LEUKOCYTE ESTERASE UR QL STRIP: NEGATIVE
LIPASE SERPL-CCNC: 75 U/L (ref 73–393)
LYMPHOCYTES # BLD AUTO: 1.9 10E9/L (ref 0.8–5.3)
LYMPHOCYTES NFR BLD AUTO: 19.5 %
MCH RBC QN AUTO: 31.2 PG (ref 26.5–33)
MCHC RBC AUTO-ENTMCNC: 34.1 G/DL (ref 31.5–36.5)
MCV RBC AUTO: 92 FL (ref 78–100)
MONOCYTES # BLD AUTO: 0.6 10E9/L (ref 0–1.3)
MONOCYTES NFR BLD AUTO: 5.6 %
MUCOUS THREADS #/AREA URNS LPF: PRESENT /LPF
NEUTROPHILS # BLD AUTO: 7.3 10E9/L (ref 1.6–8.3)
NEUTROPHILS NFR BLD AUTO: 73.9 %
NITRATE UR QL: NEGATIVE
NRBC # BLD AUTO: 0 10*3/UL
NRBC BLD AUTO-RTO: 0 /100
OPIATES UR QL SCN: NEGATIVE
PCP UR QL SCN: NEGATIVE
PH UR STRIP: 6 PH (ref 5–7)
PLATELET # BLD AUTO: 260 10E9/L (ref 150–450)
POTASSIUM SERPL-SCNC: 3.6 MMOL/L (ref 3.4–5.3)
PROT SERPL-MCNC: 7.6 G/DL (ref 6.8–8.8)
RBC # BLD AUTO: 4.45 10E12/L (ref 3.8–5.2)
RBC #/AREA URNS AUTO: <1 /HPF (ref 0–2)
SODIUM SERPL-SCNC: 137 MMOL/L (ref 133–144)
SOURCE: ABNORMAL
SP GR UR STRIP: 1.01 (ref 1–1.03)
SQUAMOUS #/AREA URNS AUTO: 1 /HPF (ref 0–1)
TROPONIN I SERPL-MCNC: <0.015 UG/L (ref 0–0.04)
UROBILINOGEN UR STRIP-MCNC: 0 MG/DL (ref 0–2)
WBC # BLD AUTO: 9.8 10E9/L (ref 4–11)
WBC #/AREA URNS AUTO: 1 /HPF (ref 0–5)

## 2020-08-19 PROCEDURE — 80053 COMPREHEN METABOLIC PANEL: CPT | Performed by: EMERGENCY MEDICINE

## 2020-08-19 PROCEDURE — 93010 ELECTROCARDIOGRAM REPORT: CPT | Mod: Z6 | Performed by: EMERGENCY MEDICINE

## 2020-08-19 PROCEDURE — 99285 EMERGENCY DEPT VISIT HI MDM: CPT | Mod: 25 | Performed by: EMERGENCY MEDICINE

## 2020-08-19 PROCEDURE — 80307 DRUG TEST PRSMV CHEM ANLYZR: CPT | Performed by: EMERGENCY MEDICINE

## 2020-08-19 PROCEDURE — 99284 EMERGENCY DEPT VISIT MOD MDM: CPT | Mod: 25 | Performed by: EMERGENCY MEDICINE

## 2020-08-19 PROCEDURE — 81001 URINALYSIS AUTO W/SCOPE: CPT | Mod: XU | Performed by: EMERGENCY MEDICINE

## 2020-08-19 PROCEDURE — 80320 DRUG SCREEN QUANTALCOHOLS: CPT | Performed by: EMERGENCY MEDICINE

## 2020-08-19 PROCEDURE — 84703 CHORIONIC GONADOTROPIN ASSAY: CPT | Performed by: EMERGENCY MEDICINE

## 2020-08-19 PROCEDURE — 93005 ELECTROCARDIOGRAM TRACING: CPT | Performed by: EMERGENCY MEDICINE

## 2020-08-19 PROCEDURE — 83690 ASSAY OF LIPASE: CPT | Performed by: EMERGENCY MEDICINE

## 2020-08-19 PROCEDURE — 84484 ASSAY OF TROPONIN QUANT: CPT | Performed by: EMERGENCY MEDICINE

## 2020-08-19 PROCEDURE — 25800030 ZZH RX IP 258 OP 636: Performed by: EMERGENCY MEDICINE

## 2020-08-19 PROCEDURE — 85025 COMPLETE CBC W/AUTO DIFF WBC: CPT | Performed by: EMERGENCY MEDICINE

## 2020-08-19 PROCEDURE — 70450 CT HEAD/BRAIN W/O DYE: CPT

## 2020-08-19 PROCEDURE — 96361 HYDRATE IV INFUSION ADD-ON: CPT | Performed by: EMERGENCY MEDICINE

## 2020-08-19 PROCEDURE — 96360 HYDRATION IV INFUSION INIT: CPT | Performed by: EMERGENCY MEDICINE

## 2020-08-19 RX ORDER — METHYLPHENIDATE HYDROCHLORIDE 36 MG/1
36 TABLET ORAL EVERY MORNING
COMMUNITY
End: 2020-08-30

## 2020-08-19 RX ORDER — SODIUM CHLORIDE 9 MG/ML
1000 INJECTION, SOLUTION INTRAVENOUS CONTINUOUS
Status: DISCONTINUED | OUTPATIENT
Start: 2020-08-19 | End: 2020-08-19 | Stop reason: HOSPADM

## 2020-08-19 RX ORDER — TRAZODONE HYDROCHLORIDE 50 MG/1
50-100 TABLET, FILM COATED ORAL AT BEDTIME
COMMUNITY
End: 2020-08-30

## 2020-08-19 RX ORDER — BACLOFEN 10 MG/1
10-20 TABLET ORAL DAILY
COMMUNITY
Start: 2020-07-08 | End: 2020-08-30

## 2020-08-19 RX ADMIN — SODIUM CHLORIDE 500 ML: 9 INJECTION, SOLUTION INTRAVENOUS at 17:02

## 2020-08-19 RX ADMIN — SODIUM CHLORIDE 1000 ML: 9 INJECTION, SOLUTION INTRAVENOUS at 19:03

## 2020-08-19 ASSESSMENT — MIFFLIN-ST. JEOR: SCORE: 1182.21

## 2020-08-19 NOTE — ED NOTES
Medication list updated with patient. [facility] is [facility] and list has been updated. Patient is on medical cannabis for help with seizure like episodes. States sometimes it makes her drowsy so took  20 mg Baclofen.    List updated.
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
## 2020-08-21 ENCOUNTER — RECORDS - HEALTHEAST (OUTPATIENT)
Dept: ADMINISTRATIVE | Facility: OTHER | Age: 30
End: 2020-08-21

## 2020-08-21 ENCOUNTER — APPOINTMENT (OUTPATIENT)
Dept: CT IMAGING | Facility: CLINIC | Age: 30
End: 2020-08-21
Attending: FAMILY MEDICINE
Payer: COMMERCIAL

## 2020-08-21 ENCOUNTER — HOSPITAL ENCOUNTER (EMERGENCY)
Facility: CLINIC | Age: 30
Discharge: OTHER INSTITUTION | End: 2020-08-21
Attending: FAMILY MEDICINE | Admitting: FAMILY MEDICINE
Payer: COMMERCIAL

## 2020-08-21 VITALS
BODY MASS INDEX: 20.89 KG/M2 | HEART RATE: 87 BPM | OXYGEN SATURATION: 95 % | SYSTOLIC BLOOD PRESSURE: 111 MMHG | RESPIRATION RATE: 20 BRPM | WEIGHT: 130 LBS | DIASTOLIC BLOOD PRESSURE: 62 MMHG | HEIGHT: 66 IN | TEMPERATURE: 99.2 F

## 2020-08-21 DIAGNOSIS — G40.901 STATUS EPILEPTICUS (H): ICD-10-CM

## 2020-08-21 LAB
ALT SERPL W/O P-5'-P-CCNC: 21 U/L (ref 13–61)
AMPHETAMINES UR QL SCN: NEGATIVE
ANION GAP SERPL CALCULATED.3IONS-SCNC: 7 MMOL/L (ref 3–14)
APAP SERPL-MCNC: <2 MG/L (ref 10–20)
APTT PPP: 27 SEC (ref 22–37)
AST SERPL-CCNC: 22 U/L (ref 15–37)
BARBITURATES UR QL: NEGATIVE
BASOPHILS # BLD AUTO: 0 10E9/L (ref 0–0.2)
BASOPHILS NFR BLD AUTO: 0.5 %
BENZODIAZ UR QL: NEGATIVE
BUN SERPL-MCNC: 16 MG/DL (ref 7–30)
CALCIUM SERPL-MCNC: 9.3 MG/DL (ref 8.5–10.1)
CANNABINOIDS UR QL SCN: POSITIVE
CHLORIDE SERPL-SCNC: 106 MMOL/L (ref 94–109)
CO2 SERPL-SCNC: 22 MMOL/L (ref 20–32)
COCAINE UR QL: NEGATIVE
CREAT SERPL-MCNC: 0.88 MG/DL (ref 0.52–1.04)
CREAT SERPL-MCNC: 0.9 MG/DL (ref 0.5–1)
DIFFERENTIAL METHOD BLD: NORMAL
EOSINOPHIL # BLD AUTO: 0 10E9/L (ref 0–0.7)
EOSINOPHIL NFR BLD AUTO: 0.3 %
ERYTHROCYTE [DISTWIDTH] IN BLOOD BY AUTOMATED COUNT: 12.4 % (ref 10–15)
ETHANOL SERPL-MCNC: <0.01 G/DL
GFR SERPL CREATININE-BSD FRML MDRD: 47 ML/MIN/{1.73_M2}
GLUCOSE BLDC GLUCOMTR-MCNC: 92 MG/DL (ref 70–99)
GLUCOSE SERPL-MCNC: 105 MG/DL (ref 70–99)
HCT VFR BLD AUTO: 42 % (ref 35–47)
HGB BLD-MCNC: 14.4 G/DL (ref 11.7–15.7)
IMM GRANULOCYTES # BLD: 0 10E9/L (ref 0–0.4)
IMM GRANULOCYTES NFR BLD: 0.1 %
INR PPP: 1.09 (ref 0.86–1.14)
LYMPHOCYTES # BLD AUTO: 3 10E9/L (ref 0.8–5.3)
LYMPHOCYTES NFR BLD AUTO: 33.7 %
MCH RBC QN AUTO: 31.4 PG (ref 26.5–33)
MCHC RBC AUTO-ENTMCNC: 34.3 G/DL (ref 31.5–36.5)
MCV RBC AUTO: 92 FL (ref 78–100)
MONOCYTES # BLD AUTO: 0.5 10E9/L (ref 0–1.3)
MONOCYTES NFR BLD AUTO: 6 %
NEUTROPHILS # BLD AUTO: 5.3 10E9/L (ref 1.6–8.3)
NEUTROPHILS NFR BLD AUTO: 59.4 %
NRBC # BLD AUTO: 0 10*3/UL
NRBC BLD AUTO-RTO: 0 /100
OPIATES UR QL SCN: NEGATIVE
PCP UR QL SCN: NEGATIVE
PLATELET # BLD AUTO: 273 10E9/L (ref 150–450)
POTASSIUM SERPL-SCNC: 3.7 MMOL/L (ref 3.4–5.3)
RBC # BLD AUTO: 4.59 10E12/L (ref 3.8–5.2)
SALICYLATES SERPL-MCNC: <2 MG/DL
SODIUM SERPL-SCNC: 135 MMOL/L (ref 133–144)
TROPONIN I SERPL-MCNC: <0.015 UG/L (ref 0–0.04)
WBC # BLD AUTO: 8.9 10E9/L (ref 4–11)

## 2020-08-21 PROCEDURE — 96375 TX/PRO/DX INJ NEW DRUG ADDON: CPT | Mod: 59 | Performed by: FAMILY MEDICINE

## 2020-08-21 PROCEDURE — 85730 THROMBOPLASTIN TIME PARTIAL: CPT | Performed by: FAMILY MEDICINE

## 2020-08-21 PROCEDURE — 93005 ELECTROCARDIOGRAM TRACING: CPT | Performed by: FAMILY MEDICINE

## 2020-08-21 PROCEDURE — 80320 DRUG SCREEN QUANTALCOHOLS: CPT | Performed by: FAMILY MEDICINE

## 2020-08-21 PROCEDURE — 99292 CRITICAL CARE ADDL 30 MIN: CPT | Mod: 25 | Performed by: FAMILY MEDICINE

## 2020-08-21 PROCEDURE — 25000125 ZZHC RX 250: Performed by: RADIOLOGY

## 2020-08-21 PROCEDURE — 00000146 ZZHCL STATISTIC GLUCOSE BY METER IP

## 2020-08-21 PROCEDURE — C9803 HOPD COVID-19 SPEC COLLECT: HCPCS | Performed by: FAMILY MEDICINE

## 2020-08-21 PROCEDURE — 85025 COMPLETE CBC W/AUTO DIFF WBC: CPT | Performed by: FAMILY MEDICINE

## 2020-08-21 PROCEDURE — 80048 BASIC METABOLIC PNL TOTAL CA: CPT | Performed by: FAMILY MEDICINE

## 2020-08-21 PROCEDURE — 99291 CRITICAL CARE FIRST HOUR: CPT | Mod: 25 | Performed by: FAMILY MEDICINE

## 2020-08-21 PROCEDURE — 96361 HYDRATE IV INFUSION ADD-ON: CPT | Performed by: FAMILY MEDICINE

## 2020-08-21 PROCEDURE — 80329 ANALGESICS NON-OPIOID 1 OR 2: CPT | Performed by: FAMILY MEDICINE

## 2020-08-21 PROCEDURE — 84484 ASSAY OF TROPONIN QUANT: CPT | Performed by: FAMILY MEDICINE

## 2020-08-21 PROCEDURE — 25000128 H RX IP 250 OP 636: Performed by: FAMILY MEDICINE

## 2020-08-21 PROCEDURE — 80329 ANALGESICS NON-OPIOID 1 OR 2: CPT | Mod: 91 | Performed by: FAMILY MEDICINE

## 2020-08-21 PROCEDURE — 25000128 H RX IP 250 OP 636: Performed by: RADIOLOGY

## 2020-08-21 PROCEDURE — 70450 CT HEAD/BRAIN W/O DYE: CPT | Mod: XS

## 2020-08-21 PROCEDURE — 96374 THER/PROPH/DIAG INJ IV PUSH: CPT | Mod: 59 | Performed by: FAMILY MEDICINE

## 2020-08-21 PROCEDURE — 70498 CT ANGIOGRAPHY NECK: CPT

## 2020-08-21 PROCEDURE — 25800030 ZZH RX IP 258 OP 636: Performed by: FAMILY MEDICINE

## 2020-08-21 PROCEDURE — 93010 ELECTROCARDIOGRAM REPORT: CPT | Mod: Z6 | Performed by: FAMILY MEDICINE

## 2020-08-21 PROCEDURE — U0003 INFECTIOUS AGENT DETECTION BY NUCLEIC ACID (DNA OR RNA); SEVERE ACUTE RESPIRATORY SYNDROME CORONAVIRUS 2 (SARS-COV-2) (CORONAVIRUS DISEASE [COVID-19]), AMPLIFIED PROBE TECHNIQUE, MAKING USE OF HIGH THROUGHPUT TECHNOLOGIES AS DESCRIBED BY CMS-2020-01-R: HCPCS | Performed by: FAMILY MEDICINE

## 2020-08-21 PROCEDURE — 80307 DRUG TEST PRSMV CHEM ANLYZR: CPT | Performed by: FAMILY MEDICINE

## 2020-08-21 PROCEDURE — 85610 PROTHROMBIN TIME: CPT | Performed by: FAMILY MEDICINE

## 2020-08-21 RX ORDER — IOPAMIDOL 755 MG/ML
70 INJECTION, SOLUTION INTRAVASCULAR ONCE
Status: COMPLETED | OUTPATIENT
Start: 2020-08-21 | End: 2020-08-21

## 2020-08-21 RX ORDER — LIDOCAINE 40 MG/G
CREAM TOPICAL
Status: DISCONTINUED | OUTPATIENT
Start: 2020-08-21 | End: 2020-08-21 | Stop reason: HOSPADM

## 2020-08-21 RX ORDER — LORAZEPAM 2 MG/ML
1 INJECTION INTRAMUSCULAR ONCE
Status: COMPLETED | OUTPATIENT
Start: 2020-08-21 | End: 2020-08-21

## 2020-08-21 RX ORDER — LEVETIRACETAM 100 MG/ML
2000 SOLUTION ORAL ONCE
Status: DISCONTINUED | OUTPATIENT
Start: 2020-08-21 | End: 2020-08-21 | Stop reason: CLARIF

## 2020-08-21 RX ADMIN — IOPAMIDOL 70 ML: 755 INJECTION, SOLUTION INTRAVENOUS at 14:37

## 2020-08-21 RX ADMIN — LEVETIRACETAM 2000 MG: 100 INJECTION, SOLUTION INTRAVENOUS at 15:53

## 2020-08-21 RX ADMIN — SODIUM CHLORIDE 500 ML: 9 INJECTION, SOLUTION INTRAVENOUS at 15:19

## 2020-08-21 RX ADMIN — LORAZEPAM 1 MG: 2 INJECTION INTRAMUSCULAR; INTRAVENOUS at 15:27

## 2020-08-21 RX ADMIN — SODIUM CHLORIDE 100 ML: 9 INJECTION, SOLUTION INTRAVENOUS at 14:37

## 2020-08-21 ASSESSMENT — MIFFLIN-ST. JEOR: SCORE: 876.43

## 2020-08-21 NOTE — ED NOTES
Pt is back from CT, able to get some information from pt, RN asking if there is someone to contact. Pt asking for phone, pt selected phone number on her phone, called identified as Ex  Juan Miguel. Per  report pt does not have anyone else to contact at this time, last spoke with pt at about 1-130ish, phone call got disconnected and has not heard from pt since. Ex  did verify pt last name and .

## 2020-08-21 NOTE — ED NOTES
Pt back from CT, pt is more alert, speech is unclear. Pt to use the bathroom, pt on bedpan, unable to go at this time. [facility] per MD orders, 600cc of clear yellow out, urine sent

## 2020-08-21 NOTE — ED TRIAGE NOTES
Pt here via EMS for seizure. Unable to obtain IV access. 2mg Ativan nasal per EMS prior to arrival. First name is Verna unable to obtain full name. Pt will not talk. Pt is tracking. Intermittently shaking.

## 2020-08-21 NOTE — ED NOTES
Pt arrived via, found at home alone, EMS unsure of last name and  at this time. Unable to obtain IV access. Upon arrival, pt has muscle twitching and stiffing, pt eyes are open and appears anxious. Monitors applied, IV access obtain and labs drawn. Pt is non verbal at this time, slow to follow commands, able to tract with eyes, weakness to right side.

## 2020-08-21 NOTE — ED NOTES
Pt resting comfortably at this time, no seziure like activity, no further medications to be given at this time. Pt will be transferred to [facility] for further treatment.
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
## 2020-08-21 NOTE — ED NOTES
Pt resting comfortably at this time, pt open eyes, pt reports she feels tired. Pt is able to communicate some. Pt swabbed for Covid and repositioned at this time. Awaiting ambulance arrival.

## 2020-08-21 NOTE — CONSULTS
"  [facility]    Stroke Telephone Note    I was called by Dr. Casimiro Morrell on 08/21/20 at 1429 regarding patient Anonymous Crow Maldonado. The patient is a female who appears to be approximately 27 years old for whom we have no past medical history or last known well.  She was brought in by EMS from private residence for reported seizure.  The patient initially had generalized shaking without loss of consciousness.  While in the ED she has reportedly had 2 episodes of seizure-like activity where she turned off to the right and was not responding, and shortly thereafter was sleepy and altered.  We have no evidence of history of seizures, the patient reportedly told EMS she has a \"clotting disorder\".  Initial CT and CTA head/neck were negative for acute pathology.      Stroke Code Data  (for stroke code without tele)  Stroke code activated 08/21/20   1429   First stroke provider response 08/21/20   1431   Last known normal      Unknown   Time of discovery   (or onset of symptoms) 08/21/20   1400   Head CT read by me 08/21/20   1438   Was stroke code de-escalated?   08/21/20 1512  symptoms not likely caused by stroke       TPA Treatment   Not given due to Suspected seizure, unknown last known well.    Endovascular Treatment  Not initiated due to absence of proximal vessel occlusion    Impression  Concern for seizure, unclear if this is a new diagnosis for the patient    Recommendations  - Loaded with Keppra 2000 mg and continue maintenance dose of 1000 mg twice daily  - Transfer to [facility] or [facility] for EEG monitoring  - Drug screen    Discussed with stroke attending Dr. Zamora and Dr. Morrell.     My recommendations are based on the limited information provided on the phone by Dr. Casimiro Morrell. They are not intended to replace the clinical judgment of Dr. Casimiro Morrell which should always be utilized to provide the most appropriate care to meet the unique needs of this patient.  " "I was not requested to personally see or examine the patient at this time.    CHAVO Flynn, CNP  Neurology  To page me or covering stroke neurology team member, click here: [facility]   Choose \"On Call\" tab at top, then search dropdown box for \"Neurology Adult\", select location, press Enter, then look for stroke/neuro ICU/telestroke.             "

## 2020-08-22 LAB
SARS-COV-2 RNA SPEC QL NAA+PROBE: NOT DETECTED
SPECIMEN SOURCE: NORMAL

## 2020-08-24 ENCOUNTER — COMMUNICATION - HEALTHEAST (OUTPATIENT)
Dept: SCHEDULING | Facility: CLINIC | Age: 30
End: 2020-08-24

## 2020-08-30 ENCOUNTER — HOSPITAL ENCOUNTER (EMERGENCY)
Facility: CLINIC | Age: 30
Discharge: HOME | End: 2020-08-30
Attending: EMERGENCY MEDICINE | Admitting: EMERGENCY MEDICINE
Payer: COMMERCIAL

## 2020-08-30 VITALS
OXYGEN SATURATION: 100 % | TEMPERATURE: 98.8 F | DIASTOLIC BLOOD PRESSURE: 129 MMHG | SYSTOLIC BLOOD PRESSURE: 144 MMHG | HEART RATE: 74 BPM | HEIGHT: 63 IN | BODY MASS INDEX: 19.49 KG/M2 | WEIGHT: 110 LBS | RESPIRATION RATE: 14 BRPM

## 2020-08-30 DIAGNOSIS — R56.9 SEIZURE-LIKE ACTIVITY (H): ICD-10-CM

## 2020-08-30 LAB
ANION GAP SERPL CALCULATED.3IONS-SCNC: 2 MMOL/L (ref 3–14)
BASOPHILS # BLD AUTO: 0 10E9/L (ref 0–0.2)
BASOPHILS NFR BLD AUTO: 0.5 %
BUN SERPL-MCNC: 16 MG/DL (ref 7–30)
CALCIUM SERPL-MCNC: 9.1 MG/DL (ref 8.5–10.1)
CHLORIDE SERPL-SCNC: 109 MMOL/L (ref 94–109)
CO2 SERPL-SCNC: 28 MMOL/L (ref 20–32)
CREAT SERPL-MCNC: 0.78 MG/DL (ref 0.52–1.04)
DIFFERENTIAL METHOD BLD: NORMAL
EOSINOPHIL # BLD AUTO: 0.2 10E9/L (ref 0–0.7)
EOSINOPHIL NFR BLD AUTO: 2.4 %
ERYTHROCYTE [DISTWIDTH] IN BLOOD BY AUTOMATED COUNT: 12 % (ref 10–15)
GFR SERPL CREATININE-BSD FRML MDRD: >90 ML/MIN/{1.73_M2}
GLUCOSE SERPL-MCNC: 80 MG/DL (ref 70–99)
HCG SERPL QL: NEGATIVE
HCT VFR BLD AUTO: 39.9 % (ref 35–47)
HGB BLD-MCNC: 13.4 G/DL (ref 11.7–15.7)
IMM GRANULOCYTES # BLD: 0 10E9/L (ref 0–0.4)
IMM GRANULOCYTES NFR BLD: 0.3 %
LYMPHOCYTES # BLD AUTO: 2.4 10E9/L (ref 0.8–5.3)
LYMPHOCYTES NFR BLD AUTO: 31.5 %
MCH RBC QN AUTO: 30.7 PG (ref 26.5–33)
MCHC RBC AUTO-ENTMCNC: 33.6 G/DL (ref 31.5–36.5)
MCV RBC AUTO: 91 FL (ref 78–100)
MONOCYTES # BLD AUTO: 0.6 10E9/L (ref 0–1.3)
MONOCYTES NFR BLD AUTO: 8 %
NEUTROPHILS # BLD AUTO: 4.3 10E9/L (ref 1.6–8.3)
NEUTROPHILS NFR BLD AUTO: 57.3 %
NRBC # BLD AUTO: 0 10*3/UL
NRBC BLD AUTO-RTO: 0 /100
PLATELET # BLD AUTO: 291 10E9/L (ref 150–450)
POTASSIUM SERPL-SCNC: 3.9 MMOL/L (ref 3.4–5.3)
RBC # BLD AUTO: 4.37 10E12/L (ref 3.8–5.2)
SODIUM SERPL-SCNC: 139 MMOL/L (ref 133–144)
WBC # BLD AUTO: 7.5 10E9/L (ref 4–11)

## 2020-08-30 PROCEDURE — 99283 EMERGENCY DEPT VISIT LOW MDM: CPT | Performed by: EMERGENCY MEDICINE

## 2020-08-30 PROCEDURE — 99284 EMERGENCY DEPT VISIT MOD MDM: CPT | Mod: Z6 | Performed by: EMERGENCY MEDICINE

## 2020-08-30 PROCEDURE — 85025 COMPLETE CBC W/AUTO DIFF WBC: CPT | Performed by: EMERGENCY MEDICINE

## 2020-08-30 PROCEDURE — 84703 CHORIONIC GONADOTROPIN ASSAY: CPT | Performed by: EMERGENCY MEDICINE

## 2020-08-30 PROCEDURE — 80048 BASIC METABOLIC PNL TOTAL CA: CPT | Performed by: EMERGENCY MEDICINE

## 2020-08-30 PROCEDURE — 80177 DRUG SCRN QUAN LEVETIRACETAM: CPT | Performed by: EMERGENCY MEDICINE

## 2020-08-30 RX ORDER — ALBUTEROL SULFATE 90 UG/1
1-2 AEROSOL, METERED RESPIRATORY (INHALATION) EVERY 4 HOURS PRN
COMMUNITY
Start: 2020-08-14

## 2020-08-30 RX ORDER — METHYLPHENIDATE HYDROCHLORIDE 36 MG/1
36 TABLET, EXTENDED RELEASE ORAL EVERY MORNING
COMMUNITY
Start: 2020-08-26

## 2020-08-30 RX ORDER — LEVETIRACETAM 500 MG/1
500 TABLET ORAL 2 TIMES DAILY
Status: ON HOLD | COMMUNITY
Start: 2020-08-26 | End: 2021-03-24

## 2020-08-30 RX ORDER — SPIRONOLACTONE 100 MG/1
100 TABLET, FILM COATED ORAL DAILY
COMMUNITY
Start: 2019-10-02 | End: 2020-08-30

## 2020-08-30 RX ORDER — TRETINOIN 1 MG/G
CREAM TOPICAL
COMMUNITY
Start: 2020-08-14

## 2020-08-30 RX ORDER — TRAZODONE HYDROCHLORIDE 50 MG/1
50-100 TABLET, FILM COATED ORAL
COMMUNITY
Start: 2020-08-26

## 2020-08-30 ASSESSMENT — MIFFLIN-ST. JEOR: SCORE: 1190.15
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
## 2020-08-30 NOTE — ED AVS SNAPSHOT
[facility] Emergency Department  [address] [address]  Phone:  [phone]  Fax:  [phone]                                    Verna Tomlin   MRN: [id number]    Department:  [facility] Emergency Department   Date of Visit:  8/30/2020           After Visit Summary Signature Page    I have received my discharge instructions, and my questions have been answered. I have discussed any challenges I see with this plan with the nurse or doctor.    ..........................................................................................................................................  Patient/Patient Representative Signature      ..........................................................................................................................................  Patient Representative Print Name and Relationship to Patient    ..................................................               ................................................  Date                                   Time    ..........................................................................................................................................  Reviewed by Signature/Title    ...................................................              ..............................................  Date                                               Time          22EPIC Rev 08/18

## 2020-09-01 LAB — LEVETIRACETAM SERPL-MCNC: 19 UG/ML (ref 12–46)

## 2020-09-02 ENCOUNTER — RECORDS - HEALTHEAST (OUTPATIENT)
Dept: HEALTH INFORMATION MANAGEMENT | Facility: CLINIC | Age: 30
End: 2020-09-02

## 2020-10-13 ENCOUNTER — COMMUNICATION - HEALTHEAST (OUTPATIENT)
Dept: SCHEDULING | Facility: CLINIC | Age: 30
End: 2020-10-13

## 2020-10-27 ENCOUNTER — HOSPITAL ENCOUNTER (EMERGENCY)
Facility: CLINIC | Age: 30
Discharge: HOME | End: 2020-10-27
Attending: PHYSICIAN ASSISTANT | Admitting: PHYSICIAN ASSISTANT
Payer: COMMERCIAL

## 2020-10-27 VITALS
TEMPERATURE: 97.8 F | OXYGEN SATURATION: 96 % | HEART RATE: 52 BPM | DIASTOLIC BLOOD PRESSURE: 64 MMHG | HEIGHT: 62 IN | BODY MASS INDEX: 20.06 KG/M2 | SYSTOLIC BLOOD PRESSURE: 98 MMHG | RESPIRATION RATE: 18 BRPM | WEIGHT: 109 LBS

## 2020-10-27 DIAGNOSIS — R56.9 SEIZURE-LIKE ACTIVITY (H): ICD-10-CM

## 2020-10-27 LAB
ALBUMIN SERPL-MCNC: 4.1 G/DL (ref 3.4–5)
ALP SERPL-CCNC: 86 U/L (ref 40–150)
ALT SERPL W P-5'-P-CCNC: 26 U/L (ref 0–50)
ANION GAP SERPL CALCULATED.3IONS-SCNC: 5 MMOL/L (ref 3–14)
AST SERPL W P-5'-P-CCNC: 24 U/L (ref 0–45)
BASOPHILS # BLD AUTO: 0 10E9/L (ref 0–0.2)
BASOPHILS NFR BLD AUTO: 0.4 %
BILIRUB SERPL-MCNC: 0.4 MG/DL (ref 0.2–1.3)
BUN SERPL-MCNC: 19 MG/DL (ref 7–30)
CALCIUM SERPL-MCNC: 9.3 MG/DL (ref 8.5–10.1)
CHLORIDE SERPL-SCNC: 108 MMOL/L (ref 94–109)
CO2 SERPL-SCNC: 27 MMOL/L (ref 20–32)
CREAT SERPL-MCNC: 0.83 MG/DL (ref 0.52–1.04)
DIFFERENTIAL METHOD BLD: NORMAL
EOSINOPHIL # BLD AUTO: 0.1 10E9/L (ref 0–0.7)
EOSINOPHIL NFR BLD AUTO: 1.5 %
ERYTHROCYTE [DISTWIDTH] IN BLOOD BY AUTOMATED COUNT: 12.4 % (ref 10–15)
GFR SERPL CREATININE-BSD FRML MDRD: >90 ML/MIN/{1.73_M2}
GLUCOSE SERPL-MCNC: 85 MG/DL (ref 70–99)
HCG SERPL QL: NEGATIVE
HCT VFR BLD AUTO: 37.9 % (ref 35–47)
HGB BLD-MCNC: 12.8 G/DL (ref 11.7–15.7)
IMM GRANULOCYTES # BLD: 0 10E9/L (ref 0–0.4)
IMM GRANULOCYTES NFR BLD: 0.1 %
LYMPHOCYTES # BLD AUTO: 2.5 10E9/L (ref 0.8–5.3)
LYMPHOCYTES NFR BLD AUTO: 34.1 %
MCH RBC QN AUTO: 30.8 PG (ref 26.5–33)
MCHC RBC AUTO-ENTMCNC: 33.8 G/DL (ref 31.5–36.5)
MCV RBC AUTO: 91 FL (ref 78–100)
MONOCYTES # BLD AUTO: 0.7 10E9/L (ref 0–1.3)
MONOCYTES NFR BLD AUTO: 9.1 %
NEUTROPHILS # BLD AUTO: 4 10E9/L (ref 1.6–8.3)
NEUTROPHILS NFR BLD AUTO: 54.8 %
NRBC # BLD AUTO: 0 10*3/UL
NRBC BLD AUTO-RTO: 0 /100
PLATELET # BLD AUTO: 299 10E9/L (ref 150–450)
POTASSIUM SERPL-SCNC: 3.9 MMOL/L (ref 3.4–5.3)
PROT SERPL-MCNC: 6.6 G/DL (ref 6.8–8.8)
RBC # BLD AUTO: 4.15 10E12/L (ref 3.8–5.2)
SODIUM SERPL-SCNC: 140 MMOL/L (ref 133–144)
WBC # BLD AUTO: 7.2 10E9/L (ref 4–11)

## 2020-10-27 PROCEDURE — 99283 EMERGENCY DEPT VISIT LOW MDM: CPT | Performed by: PHYSICIAN ASSISTANT

## 2020-10-27 PROCEDURE — 99285 EMERGENCY DEPT VISIT HI MDM: CPT | Performed by: PHYSICIAN ASSISTANT

## 2020-10-27 PROCEDURE — 80177 DRUG SCRN QUAN LEVETIRACETAM: CPT | Performed by: PHYSICIAN ASSISTANT

## 2020-10-27 PROCEDURE — 80053 COMPREHEN METABOLIC PANEL: CPT | Performed by: PHYSICIAN ASSISTANT

## 2020-10-27 PROCEDURE — 85025 COMPLETE CBC W/AUTO DIFF WBC: CPT | Performed by: PHYSICIAN ASSISTANT

## 2020-10-27 PROCEDURE — 84703 CHORIONIC GONADOTROPIN ASSAY: CPT | Performed by: PHYSICIAN ASSISTANT

## 2020-10-27 ASSESSMENT — MIFFLIN-ST. JEOR: SCORE: 1177.67
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
## 2020-10-27 NOTE — ED AVS SNAPSHOT
[facility]  [address] [address]  Phone: [phone]  Fax: [phone]                                    Verna Tomlin   MRN: [id number]    Department: [facility]   Date of Visit: 10/27/2020           After Visit Summary Signature Page    I have received my discharge instructions, and my questions have been answered. I have discussed any challenges I see with this plan with the nurse or doctor.    ..........................................................................................................................................  Patient/Patient Representative Signature      ..........................................................................................................................................  Patient Representative Print Name and Relationship to Patient    ..................................................               ................................................  Date                                   Time    ..........................................................................................................................................  Reviewed by Signature/Title    ...................................................              ..............................................  Date                                               Time          22EPIC Rev 08/18

## 2020-10-27 NOTE — ED NOTES
Pt reports known seizure disorder. Was recently placed on keppra-XR around 2 weeks ago and believes the change in medications may be causing increased seizure activity. Per pt, she had one grand mal seizure today and one yesterday.

## 2020-10-28 ASSESSMENT — ENCOUNTER SYMPTOMS
CONSTITUTIONAL NEGATIVE: 1
SEIZURES: 1
GASTROINTESTINAL NEGATIVE: 1

## 2020-10-28 NOTE — ED NOTES
Pt took out own IV.  Cath was intact on metal tray.   Advised pt that nurse needs to take IV out.   Pt gave verbal understanding.

## 2020-10-28 NOTE — DISCHARGE INSTRUCTIONS
Call our neurologist in the AM in order to schedule a follow-up and to discuss your recent increase in seizure-like episodes and to follow-up on Keppra levels.

## 2020-10-29 LAB — LEVETIRACETAM SERPL-MCNC: 15 UG/ML (ref 12–46)

## 2020-11-29 ENCOUNTER — HEALTH MAINTENANCE LETTER (OUTPATIENT)
Age: 30
End: 2020-11-29

## 2020-12-30 ENCOUNTER — HOSPITAL ENCOUNTER (EMERGENCY)
Facility: CLINIC | Age: 30
Discharge: HOME | End: 2020-12-31
Attending: EMERGENCY MEDICINE | Admitting: EMERGENCY MEDICINE

## 2020-12-30 ENCOUNTER — APPOINTMENT (OUTPATIENT)
Dept: CT IMAGING | Facility: CLINIC | Age: 30
End: 2020-12-30
Attending: EMERGENCY MEDICINE

## 2020-12-30 DIAGNOSIS — K59.00 CONSTIPATION, UNSPECIFIED CONSTIPATION TYPE: ICD-10-CM

## 2020-12-30 DIAGNOSIS — N30.00 ACUTE CYSTITIS WITHOUT HEMATURIA: ICD-10-CM

## 2020-12-30 LAB
ANION GAP SERPL CALCULATED.3IONS-SCNC: 3 MMOL/L (ref 3–14)
B-HCG FREE SERPL-ACNC: <5 IU/L
BASOPHILS # BLD AUTO: 0.1 10E9/L (ref 0–0.2)
BASOPHILS NFR BLD AUTO: 0.5 %
BUN SERPL-MCNC: 18 MG/DL (ref 7–30)
CALCIUM SERPL-MCNC: 8.9 MG/DL (ref 8.5–10.1)
CHLORIDE SERPL-SCNC: 111 MMOL/L (ref 94–109)
CO2 SERPL-SCNC: 25 MMOL/L (ref 20–32)
CREAT SERPL-MCNC: 0.9 MG/DL (ref 0.52–1.04)
DIFFERENTIAL METHOD BLD: ABNORMAL
EOSINOPHIL # BLD AUTO: 0.1 10E9/L (ref 0–0.7)
EOSINOPHIL NFR BLD AUTO: 0.6 %
ERYTHROCYTE [DISTWIDTH] IN BLOOD BY AUTOMATED COUNT: 12 % (ref 10–15)
GFR SERPL CREATININE-BSD FRML MDRD: 87 ML/MIN/{1.73_M2}
GLUCOSE SERPL-MCNC: 101 MG/DL (ref 70–99)
HCT VFR BLD AUTO: 36.8 % (ref 35–47)
HGB BLD-MCNC: 12.6 G/DL (ref 11.7–15.7)
IMM GRANULOCYTES # BLD: 0.1 10E9/L (ref 0–0.4)
IMM GRANULOCYTES NFR BLD: 0.4 %
LACTATE BLD-SCNC: 1 MMOL/L (ref 0.7–2)
LYMPHOCYTES # BLD AUTO: 2.7 10E9/L (ref 0.8–5.3)
LYMPHOCYTES NFR BLD AUTO: 18.4 %
MCH RBC QN AUTO: 30 PG (ref 26.5–33)
MCHC RBC AUTO-ENTMCNC: 34.2 G/DL (ref 31.5–36.5)
MCV RBC AUTO: 88 FL (ref 78–100)
MONOCYTES # BLD AUTO: 1.3 10E9/L (ref 0–1.3)
MONOCYTES NFR BLD AUTO: 8.8 %
NEUTROPHILS # BLD AUTO: 10.4 10E9/L (ref 1.6–8.3)
NEUTROPHILS NFR BLD AUTO: 71.3 %
NRBC # BLD AUTO: 0 10*3/UL
NRBC BLD AUTO-RTO: 0 /100
PLATELET # BLD AUTO: 263 10E9/L (ref 150–450)
POTASSIUM SERPL-SCNC: 3.4 MMOL/L (ref 3.4–5.3)
RBC # BLD AUTO: 4.2 10E12/L (ref 3.8–5.2)
SODIUM SERPL-SCNC: 139 MMOL/L (ref 133–144)
WBC # BLD AUTO: 14.5 10E9/L (ref 4–11)

## 2020-12-30 PROCEDURE — 85025 COMPLETE CBC W/AUTO DIFF WBC: CPT | Performed by: EMERGENCY MEDICINE

## 2020-12-30 PROCEDURE — 258N000003 HC RX IP 258 OP 636: Performed by: EMERGENCY MEDICINE

## 2020-12-30 PROCEDURE — 80048 BASIC METABOLIC PNL TOTAL CA: CPT | Performed by: EMERGENCY MEDICINE

## 2020-12-30 PROCEDURE — 99285 EMERGENCY DEPT VISIT HI MDM: CPT | Mod: 25

## 2020-12-30 PROCEDURE — 96374 THER/PROPH/DIAG INJ IV PUSH: CPT | Mod: 59

## 2020-12-30 PROCEDURE — 250N000011 HC RX IP 250 OP 636: Performed by: EMERGENCY MEDICINE

## 2020-12-30 PROCEDURE — 96361 HYDRATE IV INFUSION ADD-ON: CPT

## 2020-12-30 PROCEDURE — 84702 CHORIONIC GONADOTROPIN TEST: CPT

## 2020-12-30 PROCEDURE — 81001 URINALYSIS AUTO W/SCOPE: CPT | Performed by: EMERGENCY MEDICINE

## 2020-12-30 PROCEDURE — 96375 TX/PRO/DX INJ NEW DRUG ADDON: CPT

## 2020-12-30 PROCEDURE — 83605 ASSAY OF LACTIC ACID: CPT | Performed by: EMERGENCY MEDICINE

## 2020-12-30 PROCEDURE — 74177 CT ABD & PELVIS W/CONTRAST: CPT

## 2020-12-30 RX ORDER — ONDANSETRON 2 MG/ML
4 INJECTION INTRAMUSCULAR; INTRAVENOUS ONCE
Status: COMPLETED | OUTPATIENT
Start: 2020-12-30 | End: 2020-12-30

## 2020-12-30 RX ORDER — KETOROLAC TROMETHAMINE 15 MG/ML
15 INJECTION, SOLUTION INTRAMUSCULAR; INTRAVENOUS ONCE
Status: COMPLETED | OUTPATIENT
Start: 2020-12-30 | End: 2020-12-30

## 2020-12-30 RX ORDER — IOPAMIDOL 755 MG/ML
53 INJECTION, SOLUTION INTRAVASCULAR ONCE
Status: COMPLETED | OUTPATIENT
Start: 2020-12-30 | End: 2020-12-31

## 2020-12-30 RX ADMIN — SODIUM CHLORIDE 1000 ML: 9 INJECTION, SOLUTION INTRAVENOUS at 23:37

## 2020-12-30 RX ADMIN — KETOROLAC TROMETHAMINE 15 MG: 15 INJECTION, SOLUTION INTRAMUSCULAR; INTRAVENOUS at 23:49

## 2020-12-30 RX ADMIN — ONDANSETRON 4 MG: 2 INJECTION INTRAMUSCULAR; INTRAVENOUS at 23:39

## 2020-12-30 ASSESSMENT — MIFFLIN-ST. JEOR: SCORE: 1159.53

## 2020-12-30 NOTE — ED AVS SNAPSHOT
[facility]  [address] [address]  Phone: [phone]  Fax: [phone]                                    Verna Tomlin   MRN: [id number]    Department: [facility]   Date of Visit: 12/30/2020           After Visit Summary Signature Page    I have received my discharge instructions, and my questions have been answered. I have discussed any challenges I see with this plan with the nurse or doctor.    ..........................................................................................................................................  Patient/Patient Representative Signature      ..........................................................................................................................................  Patient Representative Print Name and Relationship to Patient    ..................................................               ................................................  Date                                   Time    ..........................................................................................................................................  Reviewed by Signature/Title    ...................................................              ..............................................  Date                                               Time          22EPIC Rev 08/18

## 2020-12-31 VITALS
HEART RATE: 94 BPM | BODY MASS INDEX: 19.32 KG/M2 | DIASTOLIC BLOOD PRESSURE: 77 MMHG | TEMPERATURE: 97.5 F | WEIGHT: 105 LBS | HEIGHT: 62 IN | OXYGEN SATURATION: 95 % | RESPIRATION RATE: 20 BRPM | SYSTOLIC BLOOD PRESSURE: 120 MMHG

## 2020-12-31 LAB
ALBUMIN UR-MCNC: 10 MG/DL
APPEARANCE UR: ABNORMAL
BILIRUB UR QL STRIP: NEGATIVE
COLOR UR AUTO: YELLOW
GLUCOSE UR STRIP-MCNC: NEGATIVE MG/DL
HGB UR QL STRIP: ABNORMAL
KETONES UR STRIP-MCNC: NEGATIVE MG/DL
LEUKOCYTE ESTERASE UR QL STRIP: ABNORMAL
MUCOUS THREADS #/AREA URNS LPF: PRESENT /LPF
NITRATE UR QL: NEGATIVE
PH UR STRIP: 6 PH (ref 5–7)
RBC #/AREA URNS AUTO: 6 /HPF (ref 0–2)
SOURCE: ABNORMAL
SP GR UR STRIP: 1.02 (ref 1–1.03)
SQUAMOUS #/AREA URNS AUTO: 1 /HPF (ref 0–1)
TRANS CELLS #/AREA URNS HPF: 2 /HPF (ref 0–1)
UROBILINOGEN UR STRIP-MCNC: 0 MG/DL (ref 0–2)
WBC #/AREA URNS AUTO: >182 /HPF (ref 0–5)

## 2020-12-31 PROCEDURE — 250N000011 HC RX IP 250 OP 636: Performed by: EMERGENCY MEDICINE

## 2020-12-31 PROCEDURE — 250N000009 HC RX 250: Performed by: EMERGENCY MEDICINE

## 2020-12-31 PROCEDURE — 250N000013 HC RX MED GY IP 250 OP 250 PS 637: Performed by: EMERGENCY MEDICINE

## 2020-12-31 PROCEDURE — 74177 CT ABD & PELVIS W/CONTRAST: CPT

## 2020-12-31 RX ORDER — CEPHALEXIN 500 MG/1
500 CAPSULE ORAL 3 TIMES DAILY
Qty: 30 CAPSULE | Refills: 0 | Status: SHIPPED | OUTPATIENT
Start: 2020-12-31 | End: 2021-01-10

## 2020-12-31 RX ORDER — CEPHALEXIN 500 MG/1
500 CAPSULE ORAL ONCE
Status: COMPLETED | OUTPATIENT
Start: 2020-12-31 | End: 2020-12-31

## 2020-12-31 RX ORDER — ONDANSETRON 4 MG/1
4 TABLET, ORALLY DISINTEGRATING ORAL EVERY 8 HOURS PRN
Qty: 10 TABLET | Refills: 0 | Status: SHIPPED | OUTPATIENT
Start: 2020-12-31

## 2020-12-31 RX ADMIN — IOPAMIDOL 53 ML: 755 INJECTION, SOLUTION INTRAVENOUS at 00:08

## 2020-12-31 RX ADMIN — CEPHALEXIN 500 MG: 500 CAPSULE ORAL at 01:19

## 2020-12-31 RX ADMIN — SODIUM CHLORIDE 60 ML: 9 INJECTION, SOLUTION INTRAVENOUS at 00:08

## 2020-12-31 ASSESSMENT — ENCOUNTER SYMPTOMS
WOUND: 1
CHILLS: 1
HEMATURIA: 1
VOMITING: 1
FEVER: 1
FREQUENCY: 1
ABDOMINAL PAIN: 1
FLANK PAIN: 1

## 2020-12-31 NOTE — ED NOTES
Bed: ED27  Expected date: 12/30/20  Expected time: 10:20 PM  Means of arrival: Ambulance  Comments:  HEMS 427 30F abd. Pain; hematuria
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
## 2020-12-31 NOTE — DISCHARGE INSTRUCTIONS
Antibiotics as instructed with next dose in 8 hours.  Tylenol or ibuprofen as needed for pain.  Zofran as needed for nausea and vomiting.  [facility] ensure you are improving as expected and to discuss ongoing other concerns.  Return immediately with severe pain, fever greater than 100.4  F, uncontrolled vomiting, or any other new or concerning symptoms.

## 2020-12-31 NOTE — ED TRIAGE NOTES
"Pt. Staying at a hotel in process of moving back to [place]. Onset last night w/ pain to rt. Lower back. Onset 4 hrs ago w/ sever pain to urethra harley and to rlq. Positive n/v, states only urinating small amounts at a time blood noted to urine per pt also states has \"tissue in urine\"  "

## 2021-02-16 ENCOUNTER — OFFICE VISIT (OUTPATIENT)
Dept: NEUROLOGY | Facility: CLINIC | Age: 31
End: 2021-02-16
Payer: COMMERCIAL

## 2021-02-16 ENCOUNTER — TRANSFERRED RECORDS (OUTPATIENT)
Dept: HEALTH INFORMATION MANAGEMENT | Facility: CLINIC | Age: 31
End: 2021-02-16

## 2021-02-16 VITALS
BODY MASS INDEX: 20.34 KG/M2 | SYSTOLIC BLOOD PRESSURE: 126 MMHG | DIASTOLIC BLOOD PRESSURE: 82 MMHG | WEIGHT: 111.2 LBS | TEMPERATURE: 98.6 F | HEART RATE: 84 BPM

## 2021-02-16 DIAGNOSIS — R56.9 SEIZURES (H): Primary | ICD-10-CM

## 2021-02-16 RX ORDER — LAMOTRIGINE 200 MG/1
200 TABLET ORAL DAILY
Status: ON HOLD | COMMUNITY
Start: 2020-12-11 | End: 2021-03-24

## 2021-02-16 RX ORDER — LEVETIRACETAM 750 MG/1
750 TABLET, FILM COATED, EXTENDED RELEASE ORAL DAILY
Status: ON HOLD | COMMUNITY
Start: 2020-09-09 | End: 2021-03-24
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
## 2021-02-16 NOTE — PATIENT INSTRUCTIONS
Continue same seizure medications     I have ordered inpatient Video EEG admission to better understand your case, please schedule up to 7 days for this study.  On average patients stay 5 to 7 days at the [facility].  [address] [place] [address].      During this admission please bring the following:     A list of all your medications or pill bottles    Enough of each medication to last 24 hours after discharge if you have a long commute home    Activities that we will keep you entertained and comfortable during the hospital stay, such as books, DVDs, computer, etc.      It is also helpful to meet with our nurses to better understand the hospital admission process and/or review hospital admission patient DVD.    Lacy Baker MD
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
## 2021-02-23 ENCOUNTER — TELEPHONE (OUTPATIENT)
Dept: NEUROLOGY | Facility: CLINIC | Age: 31
End: 2021-02-23

## 2021-02-23 NOTE — TELEPHONE ENCOUNTER
Patient called and said she is under a time constraint due to legal issues and is hoping Dr. Baker can write something this week for her to .

## 2021-02-23 NOTE — TELEPHONE ENCOUNTER
"I returned a call to the patient to get more information on what she is asking the letter to say. She is asking for a letter stating she is under Dr. Baker's care for epilepsy which is well controlled on medications and that she is able to parent her child.     She has had three ER visits in the past month, so I'm not sure her seizure-like spells are controlled. Patient said that her seizures happened when she accidentally weaned off her meds but is back on them. I asked her if she was ready to schedule her video EEG study but she was just starting a visit with 'another neurologist' for \"I have so many brain problems\"; so she states she will call back later to schedule.  "

## 2021-02-23 NOTE — TELEPHONE ENCOUNTER
Patient's friend Mandeep Saravia called on behalf of patient today to inquire about getting a letter from Dr. Baker. She is having some custody issues and is hoping to have a letter that states her epilepsy is controlled with medication? Patient can be called as we don't have a consent to communicate for Mandeep on file yet.
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
## 2021-02-26 ENCOUNTER — TRANSFERRED RECORDS (OUTPATIENT)
Dept: HEALTH INFORMATION MANAGEMENT | Facility: CLINIC | Age: 31
End: 2021-02-26

## 2021-02-26 ENCOUNTER — VIRTUAL VISIT (OUTPATIENT)
Dept: NEUROLOGY | Facility: CLINIC | Age: 31
End: 2021-02-26
Payer: COMMERCIAL

## 2021-02-26 DIAGNOSIS — R56.9 SEIZURES (H): Primary | ICD-10-CM

## 2021-02-26 RX ORDER — SPIRONOLACTONE 100 MG/1
100 TABLET, FILM COATED ORAL EVERY EVENING
COMMUNITY
Start: 2020-11-16

## 2021-02-26 NOTE — LETTER
2021       RE: Verna Tomlin  : 1990   MRN: [id number]      Dear Colleague,    Thank you for referring your patient, Verna Tomlin, to the [facility] EPILEPSY CARE at [facility]. Please see a copy of my visit note below.    Verna is a 31 year old who is being evaluated via a billable telephone visit.      What phone number would you like to be contacted at? [phone]  How would you like to obtain your AVS? MyChart  Phone call duration: 3 minutes  I called Verna to review driving recommendations and encouraged her to complete inpatient video EEG monitoring.  She expressed that she has to drive to take care of her daughter.  I again explained to her per [place] state law you cannot drive if you have episodes in which she loses awareness.  I encouraged her to complete inpatient video EEG evaluation to characterize target spells of concern.  She is agreeable to getting video EEG admission in 2021.  I can see her after that hospital stay.    Lacy Baker MD

## 2021-02-26 NOTE — PROGRESS NOTES
Verna is a 31 year old who is being evaluated via a billable telephone visit.      What phone number would you like to be contacted at? [phone]  How would you like to obtain your AVS? Rachel  Phone call duration: 3 minutes  I called Verna to review driving recommendations and encouraged her to complete inpatient video EEG monitoring.  She expressed that she has to drive to take care of her daughter.  I again explained to her per [place] state law you cannot drive if you have episodes in which she loses awareness.  I encouraged her to complete inpatient video EEG evaluation to characterize target spells of concern.  She is agreeable to getting video EEG admission in April 2021.  I can see her after that hospital stay.    Lacy Baker MD

## 2021-03-19 ENCOUNTER — VIRTUAL VISIT (OUTPATIENT)
Dept: NEUROLOGY | Facility: CLINIC | Age: 31
End: 2021-03-19
Payer: COMMERCIAL

## 2021-03-19 DIAGNOSIS — R56.9 SEIZURE (H): Primary | ICD-10-CM

## 2021-03-19 NOTE — PROGRESS NOTES
Admission Planning for the Epilepsy Monitoring Unit at the [facility]    [facility] provider: Lacy Baker M.D.   Admission date:  3/24/2021   Reason for admit (pre surgical, characterize, quantify burden, classify diagnosis) Event characterization - Differential Diagnosis   Discharge criteria: record 1 events of loss of consciousness       Nurse education regarding admission: Reviewed by telephone   Imaging files are accessible: Yes   EEG reports are accessible: Yes   VEEG Consent scanned on file No    needs: No   Is this a local patient?  YES   Harness room No   Barriers to discharge:  Taxi patient was reminded that she cannot drive at this time   Home environment  independent             Patient was contacted to review admission expectations. She did not accept my call on Friday, but did so today. Because of this, she did not complete her pre admission COVID screening yet. She states she will do so once the  calls her back. We reviewed the hospital stay is 3-7 days depending on EEG findings and course of stay. We reviewed barrera routines and EEG prep; safety precautions of needing to request assistance when she is out of bed, staying on the unit, no going outside to smoke (will replace with nicotine patch), privacy concerns, EEG records, seizure behavioral testing, what to bring, menu service, to bring a cooler if she brings her own food, arrive with clean dry hair, bring photos of medications, etc. She is aware that she will need to wear a surgical mask when in the presence of others during her stay.     Bri Mtz RN    She confirmed plans for arrival tomorrow morning 9:15 AM.

## 2021-03-23 DIAGNOSIS — R56.9 SEIZURE (H): ICD-10-CM

## 2021-03-23 LAB
LABORATORY COMMENT REPORT: NORMAL
SARS-COV-2 RNA RESP QL NAA+PROBE: NEGATIVE
SARS-COV-2 RNA RESP QL NAA+PROBE: NORMAL
SPECIMEN SOURCE: NORMAL
SPECIMEN SOURCE: NORMAL

## 2021-03-23 PROCEDURE — U0003 INFECTIOUS AGENT DETECTION BY NUCLEIC ACID (DNA OR RNA); SEVERE ACUTE RESPIRATORY SYNDROME CORONAVIRUS 2 (SARS-COV-2) (CORONAVIRUS DISEASE [COVID-19]), AMPLIFIED PROBE TECHNIQUE, MAKING USE OF HIGH THROUGHPUT TECHNOLOGIES AS DESCRIBED BY CMS-2020-01-R: HCPCS

## 2021-03-23 PROCEDURE — U0005 INFEC AGEN DETEC AMPLI PROBE: HCPCS

## 2021-03-24 ENCOUNTER — HOSPITAL ENCOUNTER (INPATIENT)
Dept: NEUROLOGY | Facility: CLINIC | Age: 31
DRG: 101 | End: 2021-03-24
Attending: PSYCHIATRY & NEUROLOGY | Admitting: PSYCHIATRY & NEUROLOGY
Payer: COMMERCIAL

## 2021-03-24 ENCOUNTER — HOSPITAL ENCOUNTER (INPATIENT)
Facility: CLINIC | Age: 31
LOS: 2 days | Discharge: HOME | DRG: 101 | End: 2021-03-26
Attending: PSYCHIATRY & NEUROLOGY | Admitting: PSYCHIATRY & NEUROLOGY
Payer: COMMERCIAL

## 2021-03-24 DIAGNOSIS — Z92.89 H/O ELECTROENCEPHALOGRAM: ICD-10-CM

## 2021-03-24 DIAGNOSIS — R56.9 SEIZURE (H): ICD-10-CM

## 2021-03-24 PROBLEM — R68.89 SPELLS OF DECREASED ATTENTIVENESS: Status: ACTIVE | Noted: 2021-03-24

## 2021-03-24 LAB
ALBUMIN SERPL-MCNC: 4 G/DL (ref 3.4–5)
ALP SERPL-CCNC: 74 U/L (ref 40–150)
ALT SERPL W P-5'-P-CCNC: 33 U/L (ref 0–50)
ANION GAP SERPL CALCULATED.3IONS-SCNC: 4 MMOL/L (ref 3–14)
AST SERPL W P-5'-P-CCNC: 29 U/L (ref 0–45)
B-HCG SERPL-ACNC: <1 IU/L (ref 0–5)
BILIRUB SERPL-MCNC: 0.4 MG/DL (ref 0.2–1.3)
BUN SERPL-MCNC: 16 MG/DL (ref 7–30)
CALCIUM SERPL-MCNC: 9.4 MG/DL (ref 8.5–10.1)
CHLORIDE SERPL-SCNC: 105 MMOL/L (ref 94–109)
CO2 SERPL-SCNC: 28 MMOL/L (ref 20–32)
CREAT SERPL-MCNC: 0.82 MG/DL (ref 0.52–1.04)
ERYTHROCYTE [DISTWIDTH] IN BLOOD BY AUTOMATED COUNT: 12.5 % (ref 10–15)
GFR SERPL CREATININE-BSD FRML MDRD: >90 ML/MIN/{1.73_M2}
GLUCOSE SERPL-MCNC: 97 MG/DL (ref 70–99)
HCT VFR BLD AUTO: 40.7 % (ref 35–47)
HGB BLD-MCNC: 13.7 G/DL (ref 11.7–15.7)
MCH RBC QN AUTO: 31.1 PG (ref 26.5–33)
MCHC RBC AUTO-ENTMCNC: 33.7 G/DL (ref 31.5–36.5)
MCV RBC AUTO: 93 FL (ref 78–100)
PLATELET # BLD AUTO: 354 10E9/L (ref 150–450)
POTASSIUM SERPL-SCNC: 4.2 MMOL/L (ref 3.4–5.3)
PROT SERPL-MCNC: 7 G/DL (ref 6.8–8.8)
RBC # BLD AUTO: 4.4 10E12/L (ref 3.8–5.2)
SODIUM SERPL-SCNC: 137 MMOL/L (ref 133–144)
WBC # BLD AUTO: 7.5 10E9/L (ref 4–11)

## 2021-03-24 PROCEDURE — 84702 CHORIONIC GONADOTROPIN TEST: CPT | Performed by: PHYSICIAN ASSISTANT

## 2021-03-24 PROCEDURE — 80175 DRUG SCREEN QUAN LAMOTRIGINE: CPT | Performed by: PHYSICIAN ASSISTANT

## 2021-03-24 PROCEDURE — 206N000001 HC R&B BMT UMMC

## 2021-03-24 PROCEDURE — 80053 COMPREHEN METABOLIC PANEL: CPT | Performed by: PHYSICIAN ASSISTANT

## 2021-03-24 PROCEDURE — 250N000013 HC RX MED GY IP 250 OP 250 PS 637: Performed by: PHYSICIAN ASSISTANT

## 2021-03-24 PROCEDURE — 85027 COMPLETE CBC AUTOMATED: CPT | Performed by: PHYSICIAN ASSISTANT

## 2021-03-24 PROCEDURE — 80177 DRUG SCRN QUAN LEVETIRACETAM: CPT | Performed by: PHYSICIAN ASSISTANT

## 2021-03-24 PROCEDURE — 95714 VEEG EA 12-26 HR UNMNTR: CPT

## 2021-03-24 PROCEDURE — 36415 COLL VENOUS BLD VENIPUNCTURE: CPT | Performed by: PHYSICIAN ASSISTANT

## 2021-03-24 PROCEDURE — 99221 1ST HOSP IP/OBS SF/LOW 40: CPT | Mod: 25 | Performed by: PHYSICIAN ASSISTANT

## 2021-03-24 PROCEDURE — 250N000013 HC RX MED GY IP 250 OP 250 PS 637: Performed by: STUDENT IN AN ORGANIZED HEALTH CARE EDUCATION/TRAINING PROGRAM

## 2021-03-24 PROCEDURE — 95720 EEG PHY/QHP EA INCR W/VEEG: CPT | Mod: GC | Performed by: PSYCHIATRY & NEUROLOGY

## 2021-03-24 PROCEDURE — 250N000011 HC RX IP 250 OP 636: Performed by: PHYSICIAN ASSISTANT

## 2021-03-24 PROCEDURE — 999N000127 HC STATISTIC PERIPHERAL IV START W US GUIDANCE

## 2021-03-24 RX ORDER — ALBUTEROL SULFATE 90 UG/1
1-2 AEROSOL, METERED RESPIRATORY (INHALATION) EVERY 4 HOURS PRN
Status: DISCONTINUED | OUTPATIENT
Start: 2021-03-24 | End: 2021-03-26 | Stop reason: HOSPADM

## 2021-03-24 RX ORDER — DOCUSATE SODIUM 100 MG/1
100 CAPSULE, LIQUID FILLED ORAL 2 TIMES DAILY PRN
Status: DISCONTINUED | OUTPATIENT
Start: 2021-03-24 | End: 2021-03-26 | Stop reason: HOSPADM

## 2021-03-24 RX ORDER — TRETINOIN 1 MG/G
CREAM TOPICAL AT BEDTIME
Status: DISCONTINUED | OUTPATIENT
Start: 2021-03-24 | End: 2021-03-26 | Stop reason: HOSPADM

## 2021-03-24 RX ORDER — IBUPROFEN 200 MG
200 TABLET ORAL EVERY 6 HOURS PRN
Status: DISCONTINUED | OUTPATIENT
Start: 2021-03-24 | End: 2021-03-26 | Stop reason: HOSPADM

## 2021-03-24 RX ORDER — METHYLPHENIDATE HYDROCHLORIDE 36 MG/1
36 TABLET ORAL EVERY MORNING
Status: DISCONTINUED | OUTPATIENT
Start: 2021-03-25 | End: 2021-03-26 | Stop reason: HOSPADM

## 2021-03-24 RX ORDER — TRAZODONE HYDROCHLORIDE 50 MG/1
50-100 TABLET, FILM COATED ORAL
Status: DISCONTINUED | OUTPATIENT
Start: 2021-03-24 | End: 2021-03-26 | Stop reason: HOSPADM

## 2021-03-24 RX ORDER — LEVETIRACETAM 500 MG/1
1500 TABLET, FILM COATED, EXTENDED RELEASE ORAL DAILY
Status: DISCONTINUED | OUTPATIENT
Start: 2021-03-25 | End: 2021-03-24

## 2021-03-24 RX ORDER — LIDOCAINE HYDROCHLORIDE 20 MG/ML
5 SOLUTION OROPHARYNGEAL EVERY 6 HOURS PRN
Status: DISCONTINUED | OUTPATIENT
Start: 2021-03-24 | End: 2021-03-26 | Stop reason: HOSPADM

## 2021-03-24 RX ORDER — LEVETIRACETAM 500 MG/1
500 TABLET, FILM COATED, EXTENDED RELEASE ORAL DAILY
Status: DISCONTINUED | OUTPATIENT
Start: 2021-03-25 | End: 2021-03-25

## 2021-03-24 RX ORDER — LEVETIRACETAM 500 MG/1
1500 TABLET, FILM COATED, EXTENDED RELEASE ORAL DAILY
COMMUNITY
End: 2021-04-28

## 2021-03-24 RX ORDER — CALCIUM CARBONATE 500 MG/1
500 TABLET, CHEWABLE ORAL DAILY PRN
Status: DISCONTINUED | OUTPATIENT
Start: 2021-03-24 | End: 2021-03-26 | Stop reason: HOSPADM

## 2021-03-24 RX ORDER — ACETAMINOPHEN 325 MG/1
650 TABLET ORAL EVERY 4 HOURS PRN
Status: DISCONTINUED | OUTPATIENT
Start: 2021-03-24 | End: 2021-03-26 | Stop reason: HOSPADM

## 2021-03-24 RX ORDER — LAMOTRIGINE 200 MG/1
200 TABLET, EXTENDED RELEASE ORAL EVERY MORNING
COMMUNITY
End: 2021-04-28

## 2021-03-24 RX ORDER — LORAZEPAM 2 MG/ML
2 INJECTION INTRAMUSCULAR
Status: DISCONTINUED | OUTPATIENT
Start: 2021-03-24 | End: 2021-03-26 | Stop reason: HOSPADM

## 2021-03-24 RX ORDER — LAMOTRIGINE 100 MG/1
200 TABLET, EXTENDED RELEASE ORAL EVERY MORNING
Status: DISCONTINUED | OUTPATIENT
Start: 2021-03-25 | End: 2021-03-26

## 2021-03-24 RX ORDER — GINSENG 100 MG
CAPSULE ORAL 3 TIMES DAILY PRN
Status: DISCONTINUED | OUTPATIENT
Start: 2021-03-24 | End: 2021-03-26 | Stop reason: HOSPADM

## 2021-03-24 RX ORDER — ONDANSETRON 4 MG/1
4 TABLET, ORALLY DISINTEGRATING ORAL EVERY 8 HOURS PRN
Status: DISCONTINUED | OUTPATIENT
Start: 2021-03-24 | End: 2021-03-26 | Stop reason: HOSPADM

## 2021-03-24 RX ORDER — LIDOCAINE 40 MG/G
CREAM TOPICAL
Status: DISCONTINUED | OUTPATIENT
Start: 2021-03-24 | End: 2021-03-26 | Stop reason: HOSPADM

## 2021-03-24 RX ORDER — SPIRONOLACTONE 50 MG/1
100 TABLET, FILM COATED ORAL EVERY EVENING
Status: DISCONTINUED | OUTPATIENT
Start: 2021-03-24 | End: 2021-03-26 | Stop reason: HOSPADM

## 2021-03-24 RX ADMIN — CALCIUM CARBONATE (ANTACID) CHEW TAB 500 MG 500 MG: 500 CHEW TAB at 23:00

## 2021-03-24 RX ADMIN — ONDANSETRON 4 MG: 4 TABLET, ORALLY DISINTEGRATING ORAL at 22:11

## 2021-03-24 RX ADMIN — NICOTINE 7 MG/24 HR DAILY TRANSDERMAL PATCH 1 PATCH: at 23:00

## 2021-03-24 RX ADMIN — SPIRONOLACTONE 100 MG: 50 TABLET, FILM COATED ORAL at 20:01

## 2021-03-24 ASSESSMENT — ACTIVITIES OF DAILY LIVING (ADL)
CONCENTRATING,_REMEMBERING_OR_MAKING_DECISIONS_DIFFICULTY: NO
PATIENT_/_FAMILY_COMMUNICATION_STYLE: SPOKEN LANGUAGE (ENGLISH OR BILINGUAL)
TOILETING_ISSUES: NO
WEAR_GLASSES_OR_BLIND: NO
EATING/SWALLOWING: OTHER (SEE COMMENTS)
DRESSING/BATHING_DIFFICULTY: NO
HEARING_DIFFICULTY_OR_DEAF: NO
DIFFICULTY_COMMUNICATING: NO
WHICH_OF_THE_ABOVE_FUNCTIONAL_RISKS_HAD_A_RECENT_ONSET_OR_CHANGE?: SWALLOWING;FALL HISTORY
ADLS_ACUITY_SCORE: 10
DOING_ERRANDS_INDEPENDENTLY_DIFFICULTY: NO
DIFFICULTY_EATING/SWALLOWING: YES
ADLS_ACUITY_SCORE: 10
ADLS_ACUITY_SCORE: 10
WALKING_OR_CLIMBING_STAIRS_DIFFICULTY: NO
FALL_HISTORY_WITHIN_LAST_SIX_MONTHS: YES
NUMBER_OF_TIMES_PATIENT_HAS_FALLEN_WITHIN_LAST_SIX_MONTHS: 1

## 2021-03-24 NOTE — PROGRESS NOTES
AOVSS. Here for EEG monitoring. Will sleep deprive tonight. Orders in patient care.Patient aware. Up stand by with GB. On bed alarm. Patient aware of routine. Continue with POC.

## 2021-03-24 NOTE — PROGRESS NOTES
"SPIRITUAL HEALTH SERVICES  SPIRITUAL ASSESSMENT Progress Note  [facility] 5C     PRIMARY FOCUS:   Emotional/spiritual/Hinduism distress  Support for coping    REFERRAL SOURCE: Pt request---Screening Question    ILLNESS CIRCUMSTANCES:   Reviewed documentation. Reflective conversation shared with Verna which integrated elements of illness and family narratives.   Context of Serious Illness/Symptom(s) - Verna is here for an EEG to determine her neuro symptoms for epilepsy.  Resources for Support - Not discussed.    DISTRESS:   Emotional/Spiritual/Existential Distress - Verna named that she had a \"near death experience\" at one point with a seizure and she is afraid that in trying to provoke a seizure for the EEG that \"it'll be too big and I'll die\". We rationalized that being in the hospital is the best possible place to have a seizure and she named this as relieving some of the fear.   Gnosticist Distress - Verna was raised [employer] and considers herself atheist. She named being skeptical of \"the rabbis judging me\".   Social/Cultural/Economic Distress - Verna named feeling \"terrified of being in the hospital\" and shared themes of distrust for the medical system.    SPIRITUAL/Yazidi COPING:   Caodaism/Jessie - Verna is not Hinduism and identifies most with her role as a mom to her 4yo daughter Charity.  Spiritual Practice(s) - Verna is very devoted to exercise and shared stories of climbing mountains and weight lifting as a personal and communal practice.  Emotional/Relational/Existential Connections - We explored themes around Verna knowing her own self and body and the intersection with medical diagnosis. We also explored the 'whys' of getting better and Verna named doing it for her daughter and became tearful when I asked about her relationship with her self and wanted to consider this more on her own.     PLAN: Will refer to a  colleague for energy work and I will follow up on " Friday at patient request.     Susana Rodas  Chaplain Resident  Pager: [phone]
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
## 2021-03-24 NOTE — PROGRESS NOTES
Patient admitted to: unit 5C  Admitted from: Home  Arrived by: Ambulatory  Reason for admission: EEG monitoring  Patient accompanied by: self  Belongings:  All belongings in kept in pts room  Teaching: Room and unit routine orientation  Skin double check completed by: Criss GRIMES RN

## 2021-03-25 ENCOUNTER — APPOINTMENT (OUTPATIENT)
Dept: NEUROLOGY | Facility: CLINIC | Age: 31
DRG: 101 | End: 2021-03-25
Attending: PHYSICIAN ASSISTANT
Payer: COMMERCIAL

## 2021-03-25 LAB
LAMOTRIGINE SERPL-MCNC: 4.3 UG/ML (ref 2.5–15)
LEVETIRACETAM SERPL-MCNC: 26 UG/ML (ref 12–46)

## 2021-03-25 PROCEDURE — 95714 VEEG EA 12-26 HR UNMNTR: CPT

## 2021-03-25 PROCEDURE — 250N000013 HC RX MED GY IP 250 OP 250 PS 637: Performed by: PHYSICIAN ASSISTANT

## 2021-03-25 PROCEDURE — 95720 EEG PHY/QHP EA INCR W/VEEG: CPT | Mod: GC | Performed by: PSYCHIATRY & NEUROLOGY

## 2021-03-25 PROCEDURE — 99231 SBSQ HOSP IP/OBS SF/LOW 25: CPT | Mod: 25 | Performed by: PHYSICIAN ASSISTANT

## 2021-03-25 PROCEDURE — 206N000001 HC R&B BMT UMMC

## 2021-03-25 RX ADMIN — SPIRONOLACTONE 100 MG: 50 TABLET, FILM COATED ORAL at 20:09

## 2021-03-25 RX ADMIN — TRETIONIN: 1 CREAM TOPICAL at 20:10

## 2021-03-25 RX ADMIN — LAMOTRIGINE 200 MG: 100 TABLET, FILM COATED, EXTENDED RELEASE ORAL at 07:51

## 2021-03-25 RX ADMIN — METHYLPHENIDATE HYDROCHLORIDE 36 MG: 36 TABLET ORAL at 07:51

## 2021-03-25 RX ADMIN — LEVETIRACETAM 500 MG: 500 TABLET, FILM COATED, EXTENDED RELEASE ORAL at 07:51

## 2021-03-25 ASSESSMENT — ACTIVITIES OF DAILY LIVING (ADL)
ADLS_ACUITY_SCORE: 10

## 2021-03-25 NOTE — PLAN OF CARE
Patient had no seizure activity this shift. Keeping patient awake. Walked in room multiple times. Denies any n/v. Had BM today. Patient noticed emotional at time and eyes were teary. Reports she misses her daughter. Provided emotional support. Shared her daughters picture to this writer. She will be up until 10 pm tonight. Patient aware. Voiding spontaneously. AOVSS. Up SBA + GB. Bed alarm on. Continue with POC.     Problem: Adult Inpatient Plan of Care  Goal: Plan of Care Review  Outcome: No Change  Goal: Patient-Specific Goal (Individualized)  Outcome: No Change  Goal: Absence of Hospital-Acquired Illness or Injury  Outcome: No Change  Intervention: Identify and Manage Fall Risk  Recent Flowsheet Documentation  Taken 3/25/2021 0800 by Oswald Mehta RN  Safety Promotion/Fall Prevention:   activity supervised   bed alarm on   clutter free environment maintained   fall prevention program maintained  Intervention: Prevent Skin Injury  Recent Flowsheet Documentation  Taken 3/25/2021 0800 by Oswald Mehta, RN  Body Position: position changed independently  Goal: Optimal Comfort and Wellbeing  Outcome: No Change  Goal: Readiness for Transition of Care  Outcome: No Change
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
## 2021-03-25 NOTE — PLAN OF CARE
No acute events. Patient did endorse a feeling similar to her seizures when EEG tech was doing light flashes, and she spit up some foam (this was about 10 PM- she recalled the event to me later). She was kept awake until 0400, sleeping now, will awaken at 0600. Up SBA to bathroom. Denies pain, dyspnea. Endorses nausea intermittently, zofran x1.   /65 (BP Location: Right arm)   Pulse 56   Temp 98.5  F (36.9  C) (Oral)   Resp 16   Wt 51.4 kg (113 lb 4.8 oz)   SpO2 100%   Breastfeeding No   BMI 20.72 kg/m    Problem: Adult Inpatient Plan of Care  Goal: Absence of Hospital-Acquired Illness or Injury  Intervention: Identify and Manage Fall Risk  Recent Flowsheet Documentation  Taken 3/24/2021 2200 by Arlin Villagomez  Safety Promotion/Fall Prevention:   activity supervised   assistive device/personal items within reach   bed alarm on   clutter free environment maintained   fall prevention program maintained   increase visualization of patient   lighting adjusted   safety round/check completed     Problem: Adult Inpatient Plan of Care  Goal: Optimal Comfort and Wellbeing  Outcome: No Change

## 2021-03-25 NOTE — PROGRESS NOTES
[facility], [place]   Epilepsy Service Daily Note      Interval History:   She is sleep deprived, slept about 2 hours. No target spells overnight. No major complaints.     Review of System:   No nausea, No vomiting, no headaches, no dizziness, no chest pain.     Medications:   Antiepileptic Medications Home Doses: levetiracetam XR 1500, lamotrigine   Antiepileptic Medications Current Doses: levetiracetam , lamotrigine     Exam: Blood pressure 128/80, pulse 63, temperature 97.6  F (36.4  C), temperature source Oral, resp. rate 16, weight 51.4 kg (113 lb 4.8 oz), SpO2 100 %, not currently breastfeeding.   General: NAD  Neuro: Alert and oriented. Speech fluent. Hearing intact to normal conversation. Pupils equal, round and reactive to light. EOM's intact. Visual fields full. Strong shoulder shrug bilaterally. Face symmetric, tongue midline. Strength 5/5 bilaterally. No drift or pronation. Intact FNF. Sensation intact to light touch but decreased right side of face and back of thighs per patient.  EEG: no electrographic seizures   Assessment and Plan: patient seen and discussed with Dr. Franky Lagos. Patient is a 31 year old, right-handed female with a history of PTSD and ADHD who is being admitted for video EEG monitoring to characterize seizure-like spells. No target spells thus far.     -continue vEEG monitoring  -stop levetiracetam XR starting tomorrow  -up until 10pm       Radha Koroma PA-C    Type of target event identified: event with alteration in awareness - partial motor component   Number of events: more needed  Discharge medication plan: To be decided  Further Imaging studies needed prior to discharge: No imaging required prior to discharge  Discharge transportation: not discussed  Other pertinent issues/goals for discharge: not at this time       Total time: 20 minute was spent in the care of this patient. The patient agrees with the above mentioned plan of care.  I answered all the patient's questions and addressed immediate concerns. More than 50% of time spent consisted of counseling and coordinating care, including discussion of the diagnostic significance of EEG findings, anti-seizure medication management, and planning for discharge home

## 2021-03-26 ENCOUNTER — APPOINTMENT (OUTPATIENT)
Dept: NEUROLOGY | Facility: CLINIC | Age: 31
DRG: 101 | End: 2021-03-26
Attending: PHYSICIAN ASSISTANT
Payer: COMMERCIAL

## 2021-03-26 VITALS
OXYGEN SATURATION: 98 % | TEMPERATURE: 98.3 F | HEART RATE: 80 BPM | WEIGHT: 113.5 LBS | DIASTOLIC BLOOD PRESSURE: 83 MMHG | SYSTOLIC BLOOD PRESSURE: 125 MMHG | RESPIRATION RATE: 16 BRPM | BODY MASS INDEX: 20.76 KG/M2

## 2021-03-26 LAB — GLUCOSE BLDC GLUCOMTR-MCNC: 74 MG/DL (ref 70–99)

## 2021-03-26 PROCEDURE — 99238 HOSP IP/OBS DSCHRG MGMT 30/<: CPT | Performed by: PHYSICIAN ASSISTANT

## 2021-03-26 PROCEDURE — 999N001017 HC STATISTIC GLUCOSE BY METER IP

## 2021-03-26 PROCEDURE — 95711 VEEG 2-12 HR UNMONITORED: CPT

## 2021-03-26 PROCEDURE — 250N000013 HC RX MED GY IP 250 OP 250 PS 637: Performed by: PHYSICIAN ASSISTANT

## 2021-03-26 PROCEDURE — 95714 VEEG EA 12-26 HR UNMNTR: CPT

## 2021-03-26 PROCEDURE — 95718 EEG PHYS/QHP 2-12 HR W/VEEG: CPT | Performed by: PSYCHIATRY & NEUROLOGY

## 2021-03-26 RX ORDER — LAMOTRIGINE 100 MG/1
100 TABLET, EXTENDED RELEASE ORAL EVERY MORNING
Status: DISCONTINUED | OUTPATIENT
Start: 2021-03-27 | End: 2021-03-26 | Stop reason: HOSPADM

## 2021-03-26 RX ORDER — LEVETIRACETAM 750 MG/1
1500 TABLET, FILM COATED, EXTENDED RELEASE ORAL ONCE
Status: COMPLETED | OUTPATIENT
Start: 2021-03-26 | End: 2021-03-26

## 2021-03-26 RX ADMIN — LEVETIRACETAM 1500 MG: 500 TABLET, FILM COATED, EXTENDED RELEASE ORAL at 16:41

## 2021-03-26 RX ADMIN — LAMOTRIGINE 200 MG: 100 TABLET, FILM COATED, EXTENDED RELEASE ORAL at 08:31

## 2021-03-26 RX ADMIN — METHYLPHENIDATE HYDROCHLORIDE 36 MG: 36 TABLET ORAL at 08:30

## 2021-03-26 RX ADMIN — NICOTINE 7 MG/24 HR DAILY TRANSDERMAL PATCH 1 PATCH: at 03:43

## 2021-03-26 ASSESSMENT — ACTIVITIES OF DAILY LIVING (ADL)
ADLS_ACUITY_SCORE: 10
ADLS_ACUITY_SCORE: 10
ADLS_ACUITY_SCORE: 14
ADLS_ACUITY_SCORE: 14
ADLS_ACUITY_SCORE: 10

## 2021-03-26 NOTE — PLAN OF CARE
/52 (BP Location: Right arm)   Pulse 58   Temp 98  F (36.7  C) (Oral)   Resp 16   Wt 51.4 kg (113 lb 4.8 oz)   SpO2 97%   Breastfeeding No   BMI 20.72 kg/m  RA.  Shift:[id number]   Neuro: A&O x4 intact  Cardiac: Regular  Respiratory: Lungs clear  GI/: denies nausea ,had BM 3/25  Diet/Appetite: Gluten free diet   Activity: On EEG up SBA with gait belt.  Pain: Denies pain or headache.  Skin: good ,has nicotine patch right arm.  LDA's: PIV x1 saline locked.  Other: Was on sleep deprived EEG until last evening at 2200. Sleeping without any spells.  Plan:  Continue EEG and wondering when it done.

## 2021-03-26 NOTE — PROGRESS NOTES
[facility], [place]   Epilepsy Service Daily Note      Interval History:   No target spells. Slept well last night. Tolerating admission well.     Review of System:   No nausea, No vomiting, no headaches, no dizziness, no chest pain.     Medications:   Antiepileptic Medications Home Doses: levetiracetam XR 1500, lamotrigine   Antiepileptic Medications Current Doses: lamotrigine     Exam: Blood pressure 103/71, pulse 61, temperature 97.8  F (36.6  C), temperature source Oral, resp. rate 16, weight 51.5 kg (113 lb 8 oz), SpO2 100 %, not currently breastfeeding.   General: NAD  Neuro: Alert and oriented. Speech fluent. Hearing intact to normal conversation. Pupils equal, round and reactive to light. EOM's intact. Visual fields full. Strong shoulder shrug bilaterally. Face symmetric, tongue midline. Strength 5/5 bilaterally. No drift or pronation. Intact FNF. Sensation intact to light touch but decreased right side of face and back of thighs per patient.  EEG: no electrographic seizures   Labs:    Ref. Range 3/24/2021 14:01   Keppra (Levetiracetam) Level Latest Ref Range: 12 - 46 ug/mL 26   Lamotrigine Level Latest Ref Range: 2.5 - 15.0 ug/mL 4.3     Assessment and Plan: patient seen and discussed with Dr. Wilkins   1. Patient is a 31 year old, right-handed female with a history of PTSD and ADHD who is being admitted for video EEG monitoring to characterize seizure-like spells. No target spells thus far.     -continue vEEG monitoring  -off levetiracetam starting today  -decrease lamotrigine to 100 mg starting tomorrow  -sleep deprive tonight      Radha Koroma PA-C    Type of target event identified: event with alteration in awareness - partial motor component   Number of events: more needed  Discharge medication plan: To be decided  Further Imaging studies needed prior to discharge: No imaging required prior to discharge  Discharge transportation: not discussed  Other pertinent  issues/goals for discharge: not at this time       Total time: 20 minute was spent in the care of this patient. The patient agrees with the above mentioned plan of care. I answered all the patient's questions and addressed immediate concerns. More than 50% of time spent consisted of counseling and coordinating care, including discussion of the diagnostic significance of EEG findings, anti-seizure medication management, and planning for discharge home

## 2021-03-26 NOTE — PLAN OF CARE
0700 to 1725:VSS,A&Ox4,denies pain and nausea,up  independently ,LS clear,BS+,reported adequate BM and urinary output.  Pt had one episode of c/o not feeling well shaking ,c/o pale fingers ,and being cold, BG 74,,/92 TIMOTHY Garcia notified,no new orders,warm blanket given and pt. encouraged to eat ,emotional support provided and pt.felt better.   This evening around 1600 pt .stated that  she is leaving AM[facility] PRISCA notified.Keppra was restarted and discharged orders written by MD,were discussed with pt.She verbalized understanding.PIV taken out.Pt home medication brought by security given to pt.  Pt requested a letter for her daughter's father,as a proof that she is continuing taking her medication and that she is capable of taking care of her daughter.Writer talked to Radha AGUIRRE and she said that pt. needs to talk to her primary doctor about this letter,because her doctor is the one who knows her the best.Pt accepted explanation and pt discharged home in stable condition.Pt stated will take [employer] of [employer] to go home.
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
## 2021-03-27 ENCOUNTER — PATIENT OUTREACH (OUTPATIENT)
Dept: CARE COORDINATION | Facility: CLINIC | Age: 31
End: 2021-03-27

## 2021-03-27 NOTE — PROGRESS NOTES
Date: 3/29/2021 Status: Jenn   Time: 3:00 PM Length: 30   Visit Type: [facility] TELEPHONE CALL [[id number]] LUPILLO:     Provider: Alejandro Lagos Nurse Department: [facility] EPILEPS[facility]     Patient was contacted by an RN for post DC follow up so no duplicate post DC follow up call will be made

## 2021-03-29 ENCOUNTER — VIRTUAL VISIT (OUTPATIENT)
Dept: NEUROLOGY | Facility: CLINIC | Age: 31
End: 2021-03-29
Payer: COMMERCIAL

## 2021-03-29 DIAGNOSIS — R56.9 SEIZURE (H): Primary | ICD-10-CM

## 2021-03-29 NOTE — PROGRESS NOTES
Discharge phone call:    Patient was admitted to [facility] from 2/24 to 2/26 for characterization of seizures.    Admission diagnosis:  Seizure-like spells  Discharge diagnosis:  same    Patient's understanding of the discharge diagnosis: nothing was recorded.    Hospital course: No target spells were recorded this admission despite sleep deprivation and decreasing levetiracetam. On day 2 she reported she wanted to be discharged. She was given her PTA levetiracetam XR 1500 mg prior to leaving and told to continue her PTA lamotrigine  mg and levetiracetam XR 1500 mg.      She did request a letter asking us to say she is capable of taking care of her daughter and that she is continuing to take her seizure medication. The team was paged regarding this but had already left the hospital and was unable to complete this. Patient was told she needs to contact her primary [facility] physician, Dr. Baker.     Medications confirmed:  LEV 1500,     Questions regarding admission or discharge instructions: no questions.     Confirmed follow up scheduled:  4/28 1:30 PM video visit with Dr. Baker.

## 2021-04-02 NOTE — PROGRESS NOTES
Discharge plannin year old, right-handed female with a history of PTSD and ADHD who is being admitted for video EEG monitoring to characterize seizure-like spells. Spells started 2 years ago at age 28. She was started on levetiracetam 1500 mg every day and this has controlled her seizures well. She reports a breakthrough when she accidentally took keppra 1000 mg instead of 1500 mg per day for 5 days. She was diagnosed with possible pseudoseizures at [facility].    She has 2 types of seizure like activities. Type 1: She gets goosebumps, lightheaded, smell (gasoline, bleach or smoke), fingers/hands get blue/white, lips turn blue and pupils are asymmetric. She gets muscle twitching on right side. This lasts 15-30 seconds. Last spells was in 2019 before Keppra started. Type 2: Back arching with tongue biting. This happened when she took lowere dose of keppra.     She had 3 days of VEEG monitoring. During the hospital stay, she was sleep deprived and AEDs lowered.    One minor spell was observed on day #3, patient was holding her forehead, she pressed button and she felt that something is discomfort in her stomach.  Then she pressed the button again, she thought she smelled something.  Staff asked if she was okay, she was not responsive briefly, then she has to use the bathroom.  She sounded confused.  This event had no clear EEG correlations.  This spells is probably a minor target spell, which is probably non-epileptic by nature.     Patient was advised to have more VEEG recording.  However, she did not want to stay longer for possible  issues. She left before the team have a chance for debriefing.     She will continue her PTA medications: keppra 1500 mg every day, Lamictal 200 mg every day.    She will follow up with Dr. Baker in the clinic.

## 2021-04-28 ENCOUNTER — VIRTUAL VISIT (OUTPATIENT)
Dept: NEUROLOGY | Facility: CLINIC | Age: 31
End: 2021-04-28
Payer: COMMERCIAL

## 2021-04-28 ENCOUNTER — TRANSFERRED RECORDS (OUTPATIENT)
Dept: HEALTH INFORMATION MANAGEMENT | Facility: CLINIC | Age: 31
End: 2021-04-28

## 2021-04-28 DIAGNOSIS — R56.9 SEIZURE (H): Primary | ICD-10-CM

## 2021-04-28 RX ORDER — LAMOTRIGINE 200 MG/1
200 TABLET, EXTENDED RELEASE ORAL EVERY MORNING
Qty: 90 TABLET | Refills: 3 | Status: SHIPPED | OUTPATIENT
Start: 2021-04-28 | End: 2021-12-20

## 2021-04-28 RX ORDER — LEVETIRACETAM 500 MG/1
1500 TABLET, FILM COATED, EXTENDED RELEASE ORAL DAILY
Qty: 270 TABLET | Refills: 3 | Status: SHIPPED | OUTPATIENT
Start: 2021-04-28
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
## 2021-07-05 ENCOUNTER — TELEPHONE (OUTPATIENT)
Dept: NEUROLOGY | Facility: CLINIC | Age: 31
End: 2021-07-05

## 2021-07-05 NOTE — TELEPHONE ENCOUNTER
What is the concern that needs to be addressed by a nurse? Patient called and wanted to discuss seizure she just had. Said it's the first she had since Dec    May a detailed message be left on voicemail? yes    Date of last office visit: 4/28/21    Message routed to: Mary Ann Rodrigez
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
## 2021-08-12 ENCOUNTER — TELEPHONE (OUTPATIENT)
Dept: NEUROLOGY | Facility: CLINIC | Age: 31
End: 2021-08-12

## 2021-08-12 DIAGNOSIS — R56.9 SEIZURE (H): Primary | ICD-10-CM

## 2021-08-12 NOTE — TELEPHONE ENCOUNTER
What is the concern that needs to be addressed by a nurse? Pt needs a letter for a custody hearing in court, addressing how her life has stabilized and she is under control, addressing her last phone call w Dr. Baker, official seizure diagnosis, confirm her ability to take care of her child, etc. Please write and email to pt within the next week, along with the appt notes from her last virtual visit on 4/28/21. email [email or web address], and please call back to discuss letter.     May a detailed message be left on [employer]? Yes     Date of last office visit: 4/28/21    Message routed to: mincep rn pool

## 2021-08-12 NOTE — LETTER
8/12/2021       RE: Verna Tomlin  [address]  [place] [id number]          Dear Verna,     You were seen in person for your first visit with me on 2/16/2021. I saw you for concern of blackout spells. You were admitted to the hospital on 3/24/2021 for planned video EEG to capture blackout spells on EEG. However, no full spells were recorded during the stay. You asked to leave the hospital on day 2. After discharge a follow up visit was conducted by telephone on 4/28/2021. On that date, you reported that the spells had improved. Your anti-seizure medication blood levels drawn on 3/23/2021 resulted within the expected values for the doses you are prescribed. This is evidence of medication compliance. We have not checked your blood level since. The next office visit was advised to be in 8 months (due during December 2021). Since the last visit, you did call in a report of one episode on 7/5/2021.    Your diagnosis remains as spells of unclear etiology; epileptic seizure has not yet been confirmed or ruled out.     Please contact our office to schedule a visit if you have questions or concerns.       Sincerely,     Lacy Baker MD

## 2021-08-17 NOTE — TELEPHONE ENCOUNTER
Letter prepared with factual information from the chart. Patient's request for opinion on ability to care for her children was deferred per provider due to lack of sufficient history with the provider.

## 2021-08-31 NOTE — TELEPHONE ENCOUNTER
Letter completed and patient would like to pick it up, letter is placed in the folder at the  and a sign copy is  Kept in the 30 days folder.

## 2021-09-19 ENCOUNTER — HEALTH MAINTENANCE LETTER (OUTPATIENT)
Age: 31
End: 2021-09-19

## 2021-11-16 ENCOUNTER — TELEPHONE (OUTPATIENT)
Dept: NEUROLOGY | Facility: CLINIC | Age: 31
End: 2021-11-16

## 2021-11-16 NOTE — TELEPHONE ENCOUNTER
Patient called to see if we could update her chart for legal purposes.  Please call back to get more details.

## 2021-11-16 NOTE — TELEPHONE ENCOUNTER
Call placed to patient  Last visit with  4/28/2021, 8 month follow up recommended  No appointment scheduled at this time      Patient calling to report she has not had seizures and has taken her medications regularly.  She reports that she is fulfilling obligations required by a court agreement by making this call.    I discussed with Verna that she will be due for a routine follow up with  soon, and offered to make the appointment    Appointment made for Monday 12/20/2021 at 10.45am    No other questions at this time

## 2021-11-17 ENCOUNTER — TELEPHONE (OUTPATIENT)
Dept: NEUROLOGY | Facility: CLINIC | Age: 31
End: 2021-11-17

## 2021-11-17 NOTE — TELEPHONE ENCOUNTER
Received questionnaire to be completed from [employer]. Form saved to R drive, encounter routed.

## 2021-12-20 ENCOUNTER — OFFICE VISIT (OUTPATIENT)
Dept: NEUROLOGY | Facility: CLINIC | Age: 31
End: 2021-12-20
Payer: COMMERCIAL

## 2021-12-20 VITALS
SYSTOLIC BLOOD PRESSURE: 119 MMHG | DIASTOLIC BLOOD PRESSURE: 79 MMHG | HEART RATE: 87 BPM | TEMPERATURE: 97.8 F | WEIGHT: 116.2 LBS | BODY MASS INDEX: 21.25 KG/M2

## 2021-12-20 DIAGNOSIS — R56.9 SEIZURE (H): ICD-10-CM

## 2021-12-20 RX ORDER — BACLOFEN 10 MG/1
TABLET ORAL
COMMUNITY
Start: 2021-07-21

## 2021-12-20 RX ORDER — LAMOTRIGINE 25 MG/1
TABLET ORAL
COMMUNITY
Start: 2021-09-09 | End: 2021-12-20

## 2021-12-20 RX ORDER — LAMOTRIGINE 300 MG/1
300 TABLET, EXTENDED RELEASE ORAL EVERY MORNING
Qty: 90 TABLET | Refills: 3 | Status: SHIPPED | OUTPATIENT
Start: 2021-12-20

## 2021-12-20 NOTE — PATIENT INSTRUCTIONS
Increase lamotrigine  mg per day.   Continue Levetiracetam XR: 1500 mg morning   Check labs levetiracetam, lamotrigine, cr, ast, alt  Follow-up with Samuel 3-4 months     Lacy Baker MD
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
## 2022-01-09 ENCOUNTER — HEALTH MAINTENANCE LETTER (OUTPATIENT)
Age: 32
End: 2022-01-09

## 2022-03-21 ENCOUNTER — TELEPHONE (OUTPATIENT)
Dept: NEUROLOGY | Facility: CLINIC | Age: 32
End: 2022-03-21

## 2022-03-21 NOTE — TELEPHONE ENCOUNTER
What is the concern that needs to be addressed by a nurse? Pt needs a letter stating her epilepsy is under control, when her last seizure was, and her medication is stable before she leaves the country. email letter to [email or web address]    May a detailed message be left on voicemail? yes    Date of last office visit: 12/20/21    Message routed to: mincep rn pool

## 2022-11-21 ENCOUNTER — HEALTH MAINTENANCE LETTER (OUTPATIENT)
Age: 32
End: 2022-11-21

## 2023-04-16 ENCOUNTER — HEALTH MAINTENANCE LETTER (OUTPATIENT)
Age: 33
End: 2023-04-16

## 2024-03-05 NOTE — ED TRIAGE NOTES
diagnosed with seizures 1 week ago; trying to balance meds; woke up next to toilet  
No anesthesia complications

## 2024-06-23 ENCOUNTER — HEALTH MAINTENANCE LETTER (OUTPATIENT)
Age: 34
End: 2024-06-23